# Patient Record
Sex: MALE | Race: ASIAN | NOT HISPANIC OR LATINO | URBAN - METROPOLITAN AREA
[De-identification: names, ages, dates, MRNs, and addresses within clinical notes are randomized per-mention and may not be internally consistent; named-entity substitution may affect disease eponyms.]

---

## 2020-01-04 ENCOUNTER — INPATIENT (INPATIENT)
Facility: HOSPITAL | Age: 50
LOS: 2 days | Discharge: HOME | DRG: 65 | End: 2020-01-07
Attending: INTERNAL MEDICINE | Admitting: INTERNAL MEDICINE
Payer: MEDICAID

## 2020-01-04 VITALS — WEIGHT: 194.01 LBS | HEIGHT: 67 IN

## 2020-01-04 DIAGNOSIS — I63.9 CEREBRAL INFARCTION, UNSPECIFIED: ICD-10-CM

## 2020-01-04 DIAGNOSIS — I16.1 HYPERTENSIVE EMERGENCY: ICD-10-CM

## 2020-01-04 DIAGNOSIS — Z29.9 ENCOUNTER FOR PROPHYLACTIC MEASURES, UNSPECIFIED: ICD-10-CM

## 2020-01-04 DIAGNOSIS — E11.9 TYPE 2 DIABETES MELLITUS WITHOUT COMPLICATIONS: ICD-10-CM

## 2020-01-04 LAB
ALBUMIN SERPL ELPH-MCNC: 3.9 G/DL — SIGNIFICANT CHANGE UP (ref 3.5–5)
ALP SERPL-CCNC: 75 U/L — SIGNIFICANT CHANGE UP (ref 40–120)
ALT FLD-CCNC: 28 U/L DA — SIGNIFICANT CHANGE UP (ref 10–60)
ANION GAP SERPL CALC-SCNC: 8 MMOL/L — SIGNIFICANT CHANGE UP (ref 5–17)
APTT BLD: 31 SEC — SIGNIFICANT CHANGE UP (ref 27.5–36.3)
AST SERPL-CCNC: 11 U/L — SIGNIFICANT CHANGE UP (ref 10–40)
BASOPHILS # BLD AUTO: 0.05 K/UL — SIGNIFICANT CHANGE UP (ref 0–0.2)
BASOPHILS NFR BLD AUTO: 0.5 % — SIGNIFICANT CHANGE UP (ref 0–2)
BILIRUB SERPL-MCNC: 0.6 MG/DL — SIGNIFICANT CHANGE UP (ref 0.2–1.2)
BUN SERPL-MCNC: 10 MG/DL — SIGNIFICANT CHANGE UP (ref 7–18)
CALCIUM SERPL-MCNC: 9 MG/DL — SIGNIFICANT CHANGE UP (ref 8.4–10.5)
CHLORIDE SERPL-SCNC: 105 MMOL/L — SIGNIFICANT CHANGE UP (ref 96–108)
CHOLEST SERPL-MCNC: 151 MG/DL — SIGNIFICANT CHANGE UP (ref 10–199)
CK MB BLD-MCNC: <1.8 % — SIGNIFICANT CHANGE UP (ref 0–3.5)
CK MB CFR SERPL CALC: <1 NG/ML — SIGNIFICANT CHANGE UP (ref 0–3.6)
CK SERPL-CCNC: 57 U/L — SIGNIFICANT CHANGE UP (ref 35–232)
CO2 SERPL-SCNC: 25 MMOL/L — SIGNIFICANT CHANGE UP (ref 22–31)
CREAT SERPL-MCNC: 1.06 MG/DL — SIGNIFICANT CHANGE UP (ref 0.5–1.3)
CRP SERPL-MCNC: 0.65 MG/DL — HIGH (ref 0–0.4)
EOSINOPHIL # BLD AUTO: 0.08 K/UL — SIGNIFICANT CHANGE UP (ref 0–0.5)
EOSINOPHIL NFR BLD AUTO: 0.8 % — SIGNIFICANT CHANGE UP (ref 0–6)
GLUCOSE SERPL-MCNC: 127 MG/DL — HIGH (ref 70–99)
HBA1C BLD-MCNC: 7.3 % — HIGH (ref 4–5.6)
HCT VFR BLD CALC: 47.1 % — SIGNIFICANT CHANGE UP (ref 39–50)
HDLC SERPL-MCNC: 51 MG/DL — SIGNIFICANT CHANGE UP
HGB BLD-MCNC: 16.1 G/DL — SIGNIFICANT CHANGE UP (ref 13–17)
IMM GRANULOCYTES NFR BLD AUTO: 0.5 % — SIGNIFICANT CHANGE UP (ref 0–1.5)
INR BLD: 1.1 RATIO — SIGNIFICANT CHANGE UP (ref 0.88–1.16)
LIPID PNL WITH DIRECT LDL SERPL: 86 MG/DL — SIGNIFICANT CHANGE UP
LYMPHOCYTES # BLD AUTO: 2.21 K/UL — SIGNIFICANT CHANGE UP (ref 1–3.3)
LYMPHOCYTES # BLD AUTO: 21.8 % — SIGNIFICANT CHANGE UP (ref 13–44)
MCHC RBC-ENTMCNC: 28.2 PG — SIGNIFICANT CHANGE UP (ref 27–34)
MCHC RBC-ENTMCNC: 34.2 GM/DL — SIGNIFICANT CHANGE UP (ref 32–36)
MCV RBC AUTO: 82.5 FL — SIGNIFICANT CHANGE UP (ref 80–100)
MONOCYTES # BLD AUTO: 0.48 K/UL — SIGNIFICANT CHANGE UP (ref 0–0.9)
MONOCYTES NFR BLD AUTO: 4.7 % — SIGNIFICANT CHANGE UP (ref 2–14)
NEUTROPHILS # BLD AUTO: 7.29 K/UL — SIGNIFICANT CHANGE UP (ref 1.8–7.4)
NEUTROPHILS NFR BLD AUTO: 71.7 % — SIGNIFICANT CHANGE UP (ref 43–77)
NRBC # BLD: 0 /100 WBCS — SIGNIFICANT CHANGE UP (ref 0–0)
PLATELET # BLD AUTO: 219 K/UL — SIGNIFICANT CHANGE UP (ref 150–400)
POTASSIUM SERPL-MCNC: 3.3 MMOL/L — LOW (ref 3.5–5.3)
POTASSIUM SERPL-SCNC: 3.3 MMOL/L — LOW (ref 3.5–5.3)
PROT SERPL-MCNC: 7.6 G/DL — SIGNIFICANT CHANGE UP (ref 6–8.3)
PROTHROM AB SERPL-ACNC: 12.2 SEC — SIGNIFICANT CHANGE UP (ref 10–12.9)
RBC # BLD: 5.71 M/UL — SIGNIFICANT CHANGE UP (ref 4.2–5.8)
RBC # FLD: 13.4 % — SIGNIFICANT CHANGE UP (ref 10.3–14.5)
SODIUM SERPL-SCNC: 138 MMOL/L — SIGNIFICANT CHANGE UP (ref 135–145)
TOTAL CHOLESTEROL/HDL RATIO MEASUREMENT: 3 RATIO — LOW (ref 3.4–9.6)
TRIGL SERPL-MCNC: 69 MG/DL — SIGNIFICANT CHANGE UP (ref 10–149)
TROPONIN I SERPL-MCNC: 0.08 NG/ML — HIGH (ref 0–0.04)
TROPONIN I SERPL-MCNC: 0.19 NG/ML — HIGH (ref 0–0.04)
TROPONIN I SERPL-MCNC: 0.66 NG/ML — HIGH (ref 0–0.04)
WBC # BLD: 10.16 K/UL — SIGNIFICANT CHANGE UP (ref 3.8–10.5)
WBC # FLD AUTO: 10.16 K/UL — SIGNIFICANT CHANGE UP (ref 3.8–10.5)

## 2020-01-04 PROCEDURE — 70498 CT ANGIOGRAPHY NECK: CPT | Mod: 26

## 2020-01-04 PROCEDURE — 70450 CT HEAD/BRAIN W/O DYE: CPT | Mod: 26

## 2020-01-04 PROCEDURE — 71045 X-RAY EXAM CHEST 1 VIEW: CPT | Mod: 26

## 2020-01-04 PROCEDURE — 70496 CT ANGIOGRAPHY HEAD: CPT | Mod: 26

## 2020-01-04 PROCEDURE — 99291 CRITICAL CARE FIRST HOUR: CPT

## 2020-01-04 RX ORDER — LABETALOL HCL 100 MG
10 TABLET ORAL ONCE
Refills: 0 | Status: DISCONTINUED | OUTPATIENT
Start: 2020-01-04 | End: 2020-01-04

## 2020-01-04 RX ORDER — LABETALOL HCL 100 MG
10 TABLET ORAL ONCE
Refills: 0 | Status: COMPLETED | OUTPATIENT
Start: 2020-01-04 | End: 2020-01-04

## 2020-01-04 RX ORDER — LABETALOL HCL 100 MG
100 TABLET ORAL EVERY 8 HOURS
Refills: 0 | Status: DISCONTINUED | OUTPATIENT
Start: 2020-01-04 | End: 2020-01-06

## 2020-01-04 RX ORDER — AMLODIPINE BESYLATE 2.5 MG/1
5 TABLET ORAL DAILY
Refills: 0 | Status: DISCONTINUED | OUTPATIENT
Start: 2020-01-04 | End: 2020-01-06

## 2020-01-04 RX ORDER — HEPARIN SODIUM 5000 [USP'U]/ML
5000 INJECTION INTRAVENOUS; SUBCUTANEOUS EVERY 8 HOURS
Refills: 0 | Status: DISCONTINUED | OUTPATIENT
Start: 2020-01-04 | End: 2020-01-07

## 2020-01-04 RX ORDER — HYDRALAZINE HCL 50 MG
10 TABLET ORAL ONCE
Refills: 0 | Status: COMPLETED | OUTPATIENT
Start: 2020-01-04 | End: 2020-01-04

## 2020-01-04 RX ORDER — INSULIN LISPRO 100/ML
VIAL (ML) SUBCUTANEOUS
Refills: 0 | Status: DISCONTINUED | OUTPATIENT
Start: 2020-01-04 | End: 2020-01-07

## 2020-01-04 RX ORDER — POTASSIUM CHLORIDE 20 MEQ
40 PACKET (EA) ORAL EVERY 4 HOURS
Refills: 0 | Status: COMPLETED | OUTPATIENT
Start: 2020-01-04 | End: 2020-01-05

## 2020-01-04 RX ORDER — ASPIRIN/CALCIUM CARB/MAGNESIUM 324 MG
81 TABLET ORAL DAILY
Refills: 0 | Status: DISCONTINUED | OUTPATIENT
Start: 2020-01-04 | End: 2020-01-07

## 2020-01-04 RX ADMIN — Medication 10 MILLIGRAM(S): at 21:45

## 2020-01-04 RX ADMIN — Medication 100 MILLIGRAM(S): at 23:19

## 2020-01-04 RX ADMIN — Medication 40 MILLIEQUIVALENT(S): at 21:46

## 2020-01-04 RX ADMIN — HEPARIN SODIUM 5000 UNIT(S): 5000 INJECTION INTRAVENOUS; SUBCUTANEOUS at 21:46

## 2020-01-04 RX ADMIN — Medication 10 MILLIGRAM(S): at 16:48

## 2020-01-04 RX ADMIN — AMLODIPINE BESYLATE 5 MILLIGRAM(S): 2.5 TABLET ORAL at 23:19

## 2020-01-04 RX ADMIN — Medication 81 MILLIGRAM(S): at 16:47

## 2020-01-04 RX ADMIN — Medication 10 MILLIGRAM(S): at 14:17

## 2020-01-04 NOTE — H&P ADULT - PROBLEM SELECTOR PLAN 1
CT head chronic stroke left corpus callosum, concern for acute small h'age vs calcification    CT A head and neck neg   ekg sinus tachy  ?from [facility]tn emergency 198/118 s/p labetalol 10 iv push   not a candidate for tpa  dysphagia screen  will start on lipitor and asa for secondary prevention  f/u echo with bubble study   tele monitor for rhyhtm monitor   neurology and cardiology consult CT head chronic stroke left corpus callosum, concern for acute small h'age vs calcification    CT A head and neck neg   ekg sinus tachy  ?from [facility] emergency 198/118 s/p labetalol 10 iv push   not a candidate for tpa  dysphagia screen passed   started on lipitor and asa will hold plavix for now   will start on lipitor and asa for secondary prevention  f/u echo with bubble study   tele monitor for rhyhtm monitor   neurology and cardiology consult  Discussed with Dr. Fleming

## 2020-01-04 NOTE — H&P ADULT - PROBLEM SELECTOR PLAN 2
htn 198/118 with elevated trop trend down trop   s/p labetalol 10 iv push   permissive htn   gaol keep sbp >160 over next 24 hours htn 198/118 with elevated trop trend down trop   s/p labetalol 10 iv push   permissive htn   goal keep sbp >160 over next 24 hours  started labetalol 100mg tid, amlodipine 5mg od   r/o secondary causes of htn given hypokalemia   f/u aldosterone, renin activity, metanephrine, cortisol

## 2020-01-04 NOTE — ED PROVIDER NOTE - CARE PLAN
Principal Discharge DX:	Stroke  Secondary Diagnosis:	Hypertensive emergency without congestive heart failure

## 2020-01-04 NOTE — ED ADULT NURSE NOTE - NSIMPLEMENTINTERV_GEN_ALL_ED
Implemented All Fall with Harm Risk Interventions:  [place] to call system. Call bell, personal items and telephone within reach. Instruct patient to call for assistance. Room bathroom lighting operational. Non-slip footwear when patient is off stretcher. Physically safe environment: no spills, clutter or unnecessary equipment. Stretcher in lowest position, wheels locked, appropriate side rails in place. Provide visual cue, wrist band, yellow gown, etc. Monitor gait and stability. Monitor for mental status changes and reorient to person, place, and time. Review medications for side effects contributing to fall risk. Reinforce activity limits and safety measures with patient and family. Provide visual clues: red socks.

## 2020-01-04 NOTE — H&P ADULT - NSHPPHYSICALEXAM_GEN_ALL_CORE
Vital Signs Last 24 Hrs  T(C): 37.9 (04 Jan 2020 12:53), Max: 37.9 (04 Jan 2020 12:53)  T(F): 100.2 (04 Jan 2020 12:53), Max: 100.2 (04 Jan 2020 12:53)  HR: 94 (04 Jan 2020 14:14) (94 - 119)  BP: 198/118 (04 Jan 2020 14:14) (198/118 - 198/126)  BP(mean): --  RR: 23 (04 Jan 2020 14:14) (18 - 23)  SpO2: 98% (04 Jan 2020 14:14) (98% - 98%)    PHYSICAL EXAM:  GENERAL: NAD, well-developed, WN  HEAD:  Atraumatic, Normocephalic  EYES: EOMI, PERRLA, conjunctiva and sclera clear  NECK: Supple, No JVD  CHEST/LUNG: Clear to auscultation bilaterally; No wheeze  HEART: Regular rate and rhythm; s1+ s2+  ABDOMEN: Soft, Nontender, Nondistended; Bowel sounds present  EXTREMITIES:, No clubbing, cyanosis, or edema  NEUROLOGY:AAOx3, right sided facial droop, 4/5 right upper extremity strength, 5/5 right lower, left upper and lower extremities strength, normal sensation    SKIN: No rashes or lesions

## 2020-01-04 NOTE — H&P ADULT - PROBLEM SELECTOR PLAN 4
VTE score 0   will hold dvt ppx as per neurology 2/2 concern for h'age VTE score 0   heparin sq for dvt ppx

## 2020-01-04 NOTE — ED PROVIDER NOTE - CHPI ED SYMPTOMS NEG
no blurred vision/no dizziness/no fever/no loss of consciousness/no nausea/no numbness/no vomiting/no change in level of consciousness/no confusion

## 2020-01-04 NOTE — H&P ADULT - HISTORY OF PRESENT ILLNESS
Patient is 49 year old male hx of diet controlled DM came to ED after pt ED concern for stroke.   Per pt he was completely fine last night when he went to sleep, but today morning when he woke up he noticed slurred speech, right sided facial droop, right side upper and lower extremity weakness associated with dizziness, numbness on all over his face and double vision on turning left. Pt states his symptoms are currently improving his strength is improving but he still has slurred speech and his dizziness is gone. Pt denies fever, fall, head trauma, loc, fever, difficulty hearing, chills, cough, sob, palpitation, n/v/d/c, urinary problem except slow urinary stream 2/2 bph.     pt was code stroke in the ED, had ekg showed sinus tachycardia,   CT Brain Stroke Protocol:  Low-attenuation the left corpus callosum extending into left frontal white matter suggestive of chronic infarct or gliosis.  Punctate high attenuation focus in the left corpus callosum may representing calcification or small acute hemorrhage.

## 2020-01-04 NOTE — ED PROVIDER NOTE - OBJECTIVE STATEMENT
pt went to bed "fine last night" woke up this morning with left facial droop and difficulty speaking   called sister who called ems   has a family hx of DM and HTN  pt has not been to a doctor in many years  and states he was told diabetes in the past but just cut out sugar not on medications   does not know if he has htn

## 2020-01-04 NOTE — ED PROVIDER NOTE - CLINICAL SUMMARY MEDICAL DECISION MAKING FREE TEXT BOX
pt arrives with acute stroke symptoms   will get neuro consult, labs, ekg, give labetaol for elevated BP  will treat as hypertensive emergency and admit to tele for further work up pt is outside the window for TPA

## 2020-01-05 LAB
ALBUMIN SERPL ELPH-MCNC: 3.7 G/DL — SIGNIFICANT CHANGE UP (ref 3.5–5)
ALP SERPL-CCNC: 79 U/L — SIGNIFICANT CHANGE UP (ref 40–120)
ALT FLD-CCNC: 37 U/L DA — SIGNIFICANT CHANGE UP (ref 10–60)
ANION GAP SERPL CALC-SCNC: 9 MMOL/L — SIGNIFICANT CHANGE UP (ref 5–17)
AST SERPL-CCNC: 20 U/L — SIGNIFICANT CHANGE UP (ref 10–40)
BILIRUB SERPL-MCNC: 1 MG/DL — SIGNIFICANT CHANGE UP (ref 0.2–1.2)
BUN SERPL-MCNC: 8 MG/DL — SIGNIFICANT CHANGE UP (ref 7–18)
CALCIUM SERPL-MCNC: 8.9 MG/DL — SIGNIFICANT CHANGE UP (ref 8.4–10.5)
CHLORIDE SERPL-SCNC: 106 MMOL/L — SIGNIFICANT CHANGE UP (ref 96–108)
CHOLEST SERPL-MCNC: 160 MG/DL — SIGNIFICANT CHANGE UP (ref 10–199)
CO2 SERPL-SCNC: 25 MMOL/L — SIGNIFICANT CHANGE UP (ref 22–31)
CORTIS AM PEAK SERPL-MCNC: 10.2 UG/DL — SIGNIFICANT CHANGE UP (ref 6–18.4)
CREAT SERPL-MCNC: 1.01 MG/DL — SIGNIFICANT CHANGE UP (ref 0.5–1.3)
ERYTHROCYTE [SEDIMENTATION RATE] IN BLOOD: 6 MM/HR — SIGNIFICANT CHANGE UP (ref 0–15)
FOLATE SERPL-MCNC: 14.4 NG/ML — SIGNIFICANT CHANGE UP
GLUCOSE BLDC GLUCOMTR-MCNC: 134 MG/DL — HIGH (ref 70–99)
GLUCOSE BLDC GLUCOMTR-MCNC: 136 MG/DL — HIGH (ref 70–99)
GLUCOSE BLDC GLUCOMTR-MCNC: 151 MG/DL — HIGH (ref 70–99)
GLUCOSE BLDC GLUCOMTR-MCNC: 213 MG/DL — HIGH (ref 70–99)
GLUCOSE SERPL-MCNC: 162 MG/DL — HIGH (ref 70–99)
HBA1C BLD-MCNC: 7.3 % — HIGH (ref 4–5.6)
HCT VFR BLD CALC: 49.5 % — SIGNIFICANT CHANGE UP (ref 39–50)
HDLC SERPL-MCNC: 45 MG/DL — SIGNIFICANT CHANGE UP
HGB BLD-MCNC: 16.7 G/DL — SIGNIFICANT CHANGE UP (ref 13–17)
LIPID PNL WITH DIRECT LDL SERPL: 90 MG/DL — SIGNIFICANT CHANGE UP
MAGNESIUM SERPL-MCNC: 2.4 MG/DL — SIGNIFICANT CHANGE UP (ref 1.6–2.6)
MCHC RBC-ENTMCNC: 28.4 PG — SIGNIFICANT CHANGE UP (ref 27–34)
MCHC RBC-ENTMCNC: 33.7 GM/DL — SIGNIFICANT CHANGE UP (ref 32–36)
MCV RBC AUTO: 84.2 FL — SIGNIFICANT CHANGE UP (ref 80–100)
NRBC # BLD: 0 /100 WBCS — SIGNIFICANT CHANGE UP (ref 0–0)
PHOSPHATE SERPL-MCNC: 3 MG/DL — SIGNIFICANT CHANGE UP (ref 2.5–4.5)
PLATELET # BLD AUTO: 220 K/UL — SIGNIFICANT CHANGE UP (ref 150–400)
POTASSIUM SERPL-MCNC: 4 MMOL/L — SIGNIFICANT CHANGE UP (ref 3.5–5.3)
POTASSIUM SERPL-SCNC: 4 MMOL/L — SIGNIFICANT CHANGE UP (ref 3.5–5.3)
PROT SERPL-MCNC: 7.7 G/DL — SIGNIFICANT CHANGE UP (ref 6–8.3)
RBC # BLD: 5.88 M/UL — HIGH (ref 4.2–5.8)
RBC # FLD: 13.8 % — SIGNIFICANT CHANGE UP (ref 10.3–14.5)
SODIUM SERPL-SCNC: 140 MMOL/L — SIGNIFICANT CHANGE UP (ref 135–145)
TOTAL CHOLESTEROL/HDL RATIO MEASUREMENT: 3.6 RATIO — SIGNIFICANT CHANGE UP (ref 3.4–9.6)
TRIGL SERPL-MCNC: 126 MG/DL — SIGNIFICANT CHANGE UP (ref 10–149)
TROPONIN I SERPL-MCNC: 0.71 NG/ML — HIGH (ref 0–0.04)
TSH SERPL-MCNC: 1.15 UU/ML — SIGNIFICANT CHANGE UP (ref 0.34–4.82)
VIT B12 SERPL-MCNC: 414 PG/ML — SIGNIFICANT CHANGE UP (ref 232–1245)
WBC # BLD: 8.33 K/UL — SIGNIFICANT CHANGE UP (ref 3.8–10.5)
WBC # FLD AUTO: 8.33 K/UL — SIGNIFICANT CHANGE UP (ref 3.8–10.5)

## 2020-01-05 RX ORDER — SIMVASTATIN 20 MG/1
40 TABLET, FILM COATED ORAL AT BEDTIME
Refills: 0 | Status: DISCONTINUED | OUTPATIENT
Start: 2020-01-05 | End: 2020-01-07

## 2020-01-05 RX ADMIN — Medication 81 MILLIGRAM(S): at 11:46

## 2020-01-05 RX ADMIN — Medication 2: at 12:32

## 2020-01-05 RX ADMIN — HEPARIN SODIUM 5000 UNIT(S): 5000 INJECTION INTRAVENOUS; SUBCUTANEOUS at 22:02

## 2020-01-05 RX ADMIN — HEPARIN SODIUM 5000 UNIT(S): 5000 INJECTION INTRAVENOUS; SUBCUTANEOUS at 06:24

## 2020-01-05 RX ADMIN — Medication 100 MILLIGRAM(S): at 06:24

## 2020-01-05 RX ADMIN — Medication 100 MILLIGRAM(S): at 22:02

## 2020-01-05 RX ADMIN — Medication 100 MILLIGRAM(S): at 13:41

## 2020-01-05 RX ADMIN — SIMVASTATIN 40 MILLIGRAM(S): 20 TABLET, FILM COATED ORAL at 22:02

## 2020-01-05 RX ADMIN — HEPARIN SODIUM 5000 UNIT(S): 5000 INJECTION INTRAVENOUS; SUBCUTANEOUS at 13:41

## 2020-01-05 RX ADMIN — Medication 1: at 22:23

## 2020-01-05 RX ADMIN — Medication 40 MILLIEQUIVALENT(S): at 02:55

## 2020-01-05 NOTE — CHART NOTE - NSCHARTNOTEFT_GEN_A_CORE
EVENT:  Troponin I, Serum: 0.664:  ng/mL (01.04.20 @ 23:25)  Troponin I, Serum: 0.190 ng/mL (01.04.20 @ 16:49)  Troponin I, Serum: 0.078: I ng/mL (01.04.20 @ 12:59)      OBJECTIVE:  Vital Signs Last 24 Hrs  T(C): 37 (04 Jan 2020 23:18), Max: 37.9 (04 Jan 2020 12:53)  T(F): 98.6 (04 Jan 2020 23:18), Max: 100.2 (04 Jan 2020 12:53)  HR: 78 (04 Jan 2020 23:18) (78 - 119)  BP: 184/104 (04 Jan 2020 23:18) (157/111 - 206/118)  BP(mean): --  RR: 18 (04 Jan 2020 23:18) (18 - 23)  SpO2: 99% (04 Jan 2020 23:18) (96% - 99%)    FOCUSED PHYSICAL EXAM:    LABS:                        16.1   10.16 )-----------( 219      ( 04 Jan 2020 12:59 )             47.1   CARDIAC MARKERS ( 04 Jan 2020 23:25 )  0.664 ng/mL / x     / x     / x     / x      CARDIAC MARKERS ( 04 Jan 2020 16:49 )  0.190 ng/mL / x     / x     / x     / x      CARDIAC MARKERS ( 04 Jan 2020 12:59 )  0.078 ng/mL / x     / 57 U/L / x     / <1.0 ng/mL    01-04    138  |  105  |  10  ----------------------------<  127<H>  3.3<L>   |  25  |  1.06    Ca    9.0      04 Jan 2020 12:59    TPro  7.6  /  Alb  3.9  /  TBili  0.6  /  DBili  x   /  AST  11  /  ALT  28  /  AlkPhos  75  01-04      EKG:   IMGAGING:    ASSESSMENT:  HPI:  Patient is 49 year old male hx of diet controlled DM came to ED after pt ED concern for stroke.   Per pt he was completely fine last night when he went to sleep, but today morning when he woke up he noticed slurred speech, right sided facial droop, right side upper and lower extremity weakness associated with dizziness, numbness on all over his face and double vision on turning left. Pt states his symptoms are currently improving his strength is improving but he still has slurred speech and his dizziness is gone. Pt denies fever, fall, head trauma, loc, fever, difficulty hearing, chills, cough, sob, palpitation, n/v/d/c, urinary problem except slow urinary stream 2/2 bph.     pt was code stroke in the ED, had ekg showed sinus tachycardia,   CT Brain Stroke Protocol:  Low-attenuation the left corpus callosum extending into left frontal white matter suggestive of chronic infarct or gliosis.  Punctate high attenuation focus in the left corpus callosum may representing calcification or small acute hemorrhage. (04 Jan 2020 15:34)      PLAN:   1. EKG now  FOLLOW UP / RESULT: EVENT:  Troponin I, Serum: 0.664:  ng/mL (01.04.20 @ 23:25)  Troponin I, Serum: 0.190 ng/mL (01.04.20 @ 16:49)  Troponin I, Serum: 0.078: I ng/mL (01.04.20 @ 12:59)    BRIEF HPI: 49 year old male hx of diet controlled DM came to ED after pt ED concern for stroke.   Per pt he was completely fine last night when he went to sleep, but today morning when he woke up he noticed slurred speech, right sided facial droop, right side upper and lower extremity weakness associated with dizziness, numbness on all over his face and double vision on turning left. Symptoms resolved, MRI pending. Troponin trending up, denies chest pain, no EKG changes. Awaiting bed on tele floor for further management.    OBJECTIVE:  Vital Signs Last 24 Hrs  T(C): 37 (04 Jan 2020 23:18), Max: 37.9 (04 Jan 2020 12:53)  T(F): 98.6 (04 Jan 2020 23:18), Max: 100.2 (04 Jan 2020 12:53)  HR: 78 (04 Jan 2020 23:18) (78 - 119)  BP: 184/104 (04 Jan 2020 23:18) (157/111 - 206/118)  BP(mean): --  RR: 18 (04 Jan 2020 23:18) (18 - 23)  SpO2: 99% (04 Jan 2020 23:18) (96% - 99%)    FOCUSED PHYSICAL EXAM:  GENERAL: NAD, well-developed  HEAD:  Atraumatic, Normocephalic  EYES: EOMI, PERRLA, conjunctiva and sclera clear  NECK: Supple, No JVD  CHEST/LUNG: Clear to auscultation bilaterally; No wheezes or rales  HEART: Regular rate and rhythm; No murmurs, rubs, or gallops  ABDOMEN: Soft, Nontender, Nondistended, Normal bowel sounds  EXTREMITIES:  2+ Peripheral Pulses, No clubbing, cyanosis, or edema  Neurological: AOx4  Skin: Warm and dry  Musculoskeletal: Atraumatic, no joint effusions  Psychiatric: Normal affect    LABS:                        16.1   10.16 )-----------( 219      ( 04 Jan 2020 12:59 )             47.1   CARDIAC MARKERS ( 04 Jan 2020 23:25 )  0.664 ng/mL / x     / x     / x     / x      CARDIAC MARKERS ( 04 Jan 2020 16:49 )  0.190 ng/mL / x     / x     / x     / x      CARDIAC MARKERS ( 04 Jan 2020 12:59 )  0.078 ng/mL / x     / 57 U/L / x     / <1.0 ng/mL    01-04    138  |  105  |  10  ----------------------------<  127<H>  3.3<L>   |  25  |  1.06    Ca    9.0      04 Jan 2020 12:59    TPro  7.6  /  Alb  3.9  /  TBili  0.6  /  DBili  x   /  AST  11  /  ALT  28  /  AlkPhos  75  01-04      EKG: NSR HR 72 bpm, no ST segment  or t wave changes  IMAGING:  EXAM:  XR CHEST AP OR PA 1V                        PROCEDURE DATE:  01/04/2020    INTERPRETATION:  CLINICAL INDICATION: 49 years  Male with stroke.  COMPARISON: None  AP view of the chest demonstrates the lungs to be clear. There is no pleural effusion. There is no pneumothorax.  The heart is mildly enlarged. There is no mediastinal or hilar mass.   The pulmonary vasculature is normal.   Mild thoracic degenerative changes are present.  IMPRESSION:  Mild cardiomegaly. No acute infiltrate. Punctate high attenuation focus in the left corpus callosum may representing calcification or small acute hemorrhage. (04 Jan 2020 15:34)    PROBLEM: Troponin trending up probably due to hypertensive emergency without congestive heart failure  PLAN:   1. EKG now  2. Troponin at 6 am  3. Pain assessment per policy.    FOLLOW UP / RESULT: Follow up AM troponin

## 2020-01-05 NOTE — CONSULT NOTE ADULT - SUBJECTIVE AND OBJECTIVE BOX
Patient is a 49y old  Male who presents with a chief complaint of stroke symptoms  (05 Jan 2020 12:15)      HPI: Patient presented to ER yesterday after awakening with right sided facial droop, slurred speech and right sided weakness. He had gone to bed normally. When he presented he was not given TPA due to unknown last known normal.   He denies any prior hx of stroke or stroke like symptoms. He has double vision when looking to the left and he reports continued right sided weakness and poor dexterity with right side.   He denies any headache, nausea or vomiting or numbness or other associated symptoms.      PAST MEDICAL & SURGICAL HISTORY:  Pre-diabetes  No significant past surgical history  Benign prostatic hypertrophy      FAMILY HISTORY:  FH: diabetes mellitus: sister and father        Social Hx:  Nonsmoker, no drug or alcohol use    MEDICATIONS  (STANDING):    amLODIPine   Tablet 5 milliGRAM(s) Oral daily  aspirin enteric coated 81 milliGRAM(s) Oral daily  heparin  Injectable 5000 Unit(s) SubCutaneous every 8 hours  insulin lispro (HumaLOG) corrective regimen sliding scale   SubCutaneous Before meals and at bedtime  labetalol 100 milliGRAM(s) Oral every 8 hours  simvastatin 40 milliGRAM(s) Oral at bedtime       Allergies:    No Known Allergies    Intolerances        ROS: Pertinent positives in HPI, all other ROS were reviewed and are negative.      Vital Signs Last 24 Hrs  T(C): 36.8 (05 Jan 2020 08:27), Max: 37 (04 Jan 2020 23:18)  T(F): 98.3 (05 Jan 2020 08:27), Max: 98.6 (04 Jan 2020 23:18)  HR: 87 (05 Jan 2020 08:27) (78 - 94)  BP: 138/84 (05 Jan 2020 08:27) (138/84 - 206/118)  BP(mean): --  RR: 18 (05 Jan 2020 08:27) (18 - 23)  SpO2: 97% (05 Jan 2020 08:27) (96% - 99%)        Constitutional: awake and alert.  HEENT: PERRLA, EOMI,   Neck: Supple.  Respiratory: Breath sounds are clear bilaterally  Cardiovascular: S1 and S2, regular / irregular rhythm  Gastrointestinal: soft, nontender  Extremities:  no edema  Vascular: Carotid  Bruit - no  Musculoskeletal: no joint swelling/tenderness, no abnormal movements  Skin: No rashes    Neurological exam:  HF: A x O x 3. Appropriately interactive, normal affect. Speech is slurred, No Aphasia or paraphasic errors. Naming /repetition intact   CN: BETTINA, on Left gaze he has double vision due to left CN 6 palsy  , VFF, facial sensation normal, no NLFD, tongue midline, Palate moves equally, SCM equal bilaterally  Motor: No pronator drift, Strength 5-/5 on right arm and leg and he has drift of left leg, normal bulk and tone, no tremor, rigidity or bradykinesia.    Sens: Intact to light touch / PP/ VS  Reflexes: Symmetric and normal . BJ 2+, BR 2+, KJ 2+, AJ 2+, downgoing toes b/l  Coord:  No FNFA,  heel to shin on right side was dysmetric, SINDY intact  Gait/Balance: Did not assess     NIHSS: was a 3           Labs:                        16.7   8.33  )-----------( 220      ( 05 Jan 2020 07:10 )             49.5     01-05    140  |  106  |  8   ----------------------------<  162<H>  4.0   |  25  |  1.01    Ca    8.9      05 Jan 2020 07:10  Phos  3.0     01-05  Mg     2.4     01-05    TPro  7.7  /  Alb  3.7  /  TBili  1.0  /  DBili  x   /  AST  20  /  ALT  37  /  AlkPhos  79  01-05    01-05 [id number] 7.3  01-04 [id number] 7.3    01-05 Chol 160 LDL 90 HDL 45 Trig 126, 01-04 Chol 151 LDL 86 HDL 51 Trig 69  PT/INR - ( 04 Jan 2020 12:59 )   PT: 12.2 sec;   INR: 1.10 ratio         PTT - ( 04 Jan 2020 12:59 )  PTT:31.0 sec    Radiology report:  - CT head: showed old chronic infarct in the left frontal white matter, corpus callosum and question of calcification vs hyperattenuation in left CC    CTA of the neck and head was unremarkable and no evidence of large vessel occlusion.      A/P: 49 year old presenting with new onset right sided weakness and slurred speech and concerning for an acute infarct, likely small vessel or embolic source of unknown cause. Would recommend the following:     - No IV tpa given because no last known normal     - ASA 81 mg for now and once MRI obtained and no blood would also add plavix 75 mg for 3 weeks and then ASA alone per CHANCE trial    - Statin daily    - Dysphagia screen    - DVT prophylaxis    - MRI brain pending    - PT eval    - Speech/swallow eval    -TTE and cardiology evaluation     Total Critical Care Time spent: 70 minutes and all questions answered and discussed with NP and medical team.

## 2020-01-05 NOTE — PATIENT PROFILE ADULT - NSPROEDALEARNPREF_GEN_A_NUR
verbal instruction/written material written material/verbal instruction/pictorial/skill demonstration

## 2020-01-05 NOTE — PROGRESS NOTE ADULT - ASSESSMENT
49 yr old male, from home denies past medical history not seen doctor over 20 yrs , admit hospital for facial droop and right weakness, CT head left corpus callosum extended left front white matter chronic infract with punctate calcification small hemorrhage(?), CTA neck/brain unremarkable, neuro consult TPA not given due to pass the window, continue ASA/plavis, MRI of brain burble study, cardiology consult, tele, PT eval fall precaution

## 2020-01-05 NOTE — CONSULT NOTE ADULT - SUBJECTIVE AND OBJECTIVE BOX
Requesting Physician : Dr. Walker    Reason for Consultation: Abnormal ECG; rule out CV cause of possible CVA    HISTORY OF PRESENT ILLNESS:  48 yo M with history of DM who is being seen for cardioembolic cause of possible CVA.  The patient was admitted with right facial numbness for one day that was associated with dizziness, double vision, and right sided weakness.  The patient denies chest pain or anginal symptoms.  He has no CORRAL, orthopnea, or LE edema.  The patient was admitted for stroke workup.  Code stroke was called but the patient did not receive TPA because he was out of the time window.  CTH was performed showing chronic infarct.  Cardiac workup thus far includes ECG showing SR with non-specific T wave changes and cardiac enzymes that show trace positive troponin.           PAST MEDICAL & SURGICAL HISTORY:  Pre-diabetes  No significant past surgical history          MEDICATIONS:  MEDICATIONS  (STANDING):  amLODIPine   Tablet 5 milliGRAM(s) Oral daily  aspirin enteric coated 81 milliGRAM(s) Oral daily  heparin  Injectable 5000 Unit(s) SubCutaneous every 8 hours  insulin lispro (HumaLOG) corrective regimen sliding scale   SubCutaneous Before meals and at bedtime  labetalol 100 milliGRAM(s) Oral every 8 hours      Allergies    No Known Allergies    Intolerances        FAMILY HISTORY:  FH: diabetes mellitus: sister and father    Non-contributary for premature coronary disease or sudden cardiac death    SOCIAL HISTORY:    [x ] Non-smoker  [ ] Smoker  [ ] Alcohol      REVIEW OF SYSTEMS:  [ ]chest pain  [  ]shortness of breath  [  ]palpitations  [  ]syncope  [ ]near syncope [x ]upper extremity weakness   [x ] lower extremity weakness  [ x ]diplopia  [  ]altered mental status   [  ]fevers  [ ]chills [ ]nausea  [ ]vomitting  [  ]dysphagia    [ ]abdominal pain  [ ]melena  [ ]BRBPR    [  ]epistaxis  [  ]rash    [ ]lower extremity edema        [x ] All others negative	  [ ] Unable to obtain    PHYSICAL EXAM:  T(C): 36.8 (01-05-20 @ 08:27), Max: 37.9 (01-04-20 @ 12:53)  HR: 87 (01-05-20 @ 08:27) (78 - 119)  BP: 138/84 (01-05-20 @ 08:27) (138/84 - 206/118)  RR: 18 (01-05-20 @ 08:27) (18 - 23)  SpO2: 97% (01-05-20 @ 08:27) (96% - 99%)  Wt(kg): --  I&O's Summary        	  Lymphatic: No lymphadenopathy , no edema  Cardiovascular: Normal S1 S2, No JVD, No murmurs , Peripheral pulses palpable 2+ bilaterally  Respiratory: Lungs clear to auscultation, normal effort 	  Gastrointestinal:  Soft, Non-tender, + BS	  Skin: No rashes, No ecchymoses, No cyanosis, warm to touch          TELEMETRY: SR	    ECG:  SR, non-specific t wave changes 	  RADIOLOGY:  OTHER:     DIAGNOSTIC TESTING:  [ ] Echocardiogram:  [ ]  Catheterization:  [ ] Stress Test:    	  	  LABS:	 	    CARDIAC MARKERS:  CARDIAC MARKERS ( 05 Jan 2020 07:10 )  0.713 ng/mL / x     / x     / x     / x      CARDIAC MARKERS ( 04 Jan 2020 23:25 )  0.664 ng/mL / x     / x     / x     / x      CARDIAC MARKERS ( 04 Jan 2020 16:49 )  0.190 ng/mL / x     / x     / x     / x      CARDIAC MARKERS ( 04 Jan 2020 12:59 )  0.078 ng/mL / x     / 57 U/L / x     / <1.0 ng/mL                              16.7   8.33  )-----------( 220      ( 05 Jan 2020 07:10 )             49.5     01-05    140  |  106  |  8   ----------------------------<  162<H>  4.0   |  25  |  1.01    Ca    8.9      05 Jan 2020 07:10  Phos  3.0     01-05  Mg     2.4     01-05    TPro  7.7  /  Alb  3.7  /  TBili  1.0  /  DBili  x   /  AST  20  /  ALT  37  /  AlkPhos  79  01-05    proBNP:   Lipid Profile:   HgA1c: Hemoglobin A1C, Whole Blood: 7.3 % (01-05 @ 10:42)  Hemoglobin A1C, Whole Blood: 7.3 % (01-04 @ 22:20)    TSH: Thyroid Stimulating Hormone, Serum: 1.15 uU/mL (01-05 @ 07:10)      ASSESSMENT/PLAN: 48 yo M with history of DM who is being seen for cardioembolic cause of possible CVA.    -pt. with trace troponin with negative CPK and no anginal symptoms - therefore do not suspect acs  -would check TTE  -monitor tele to rule out afib of possible cva  -stroke workup workup per neuro  -further cardiac workup pending above    Jaleel Hess MD

## 2020-01-06 LAB
ALBUMIN SERPL ELPH-MCNC: 3.6 G/DL — SIGNIFICANT CHANGE UP (ref 3.5–5)
ALDOST SERPL-MCNC: 23 NG/DL — SIGNIFICANT CHANGE UP
ALP SERPL-CCNC: 77 U/L — SIGNIFICANT CHANGE UP (ref 40–120)
ALT FLD-CCNC: 36 U/L DA — SIGNIFICANT CHANGE UP (ref 10–60)
ANION GAP SERPL CALC-SCNC: 5 MMOL/L — SIGNIFICANT CHANGE UP (ref 5–17)
AST SERPL-CCNC: 16 U/L — SIGNIFICANT CHANGE UP (ref 10–40)
BILIRUB SERPL-MCNC: 0.9 MG/DL — SIGNIFICANT CHANGE UP (ref 0.2–1.2)
BUN SERPL-MCNC: 12 MG/DL — SIGNIFICANT CHANGE UP (ref 7–18)
CALCIUM SERPL-MCNC: 9.2 MG/DL — SIGNIFICANT CHANGE UP (ref 8.4–10.5)
CHLORIDE SERPL-SCNC: 107 MMOL/L — SIGNIFICANT CHANGE UP (ref 96–108)
CO2 SERPL-SCNC: 28 MMOL/L — SIGNIFICANT CHANGE UP (ref 22–31)
CREAT SERPL-MCNC: 1.16 MG/DL — SIGNIFICANT CHANGE UP (ref 0.5–1.3)
GLUCOSE BLDC GLUCOMTR-MCNC: 113 MG/DL — HIGH (ref 70–99)
GLUCOSE BLDC GLUCOMTR-MCNC: 128 MG/DL — HIGH (ref 70–99)
GLUCOSE BLDC GLUCOMTR-MCNC: 140 MG/DL — HIGH (ref 70–99)
GLUCOSE BLDC GLUCOMTR-MCNC: 176 MG/DL — HIGH (ref 70–99)
GLUCOSE SERPL-MCNC: 163 MG/DL — HIGH (ref 70–99)
HCT VFR BLD CALC: 47 % — SIGNIFICANT CHANGE UP (ref 39–50)
HGB BLD-MCNC: 15.6 G/DL — SIGNIFICANT CHANGE UP (ref 13–17)
INR BLD: 1.15 RATIO — SIGNIFICANT CHANGE UP (ref 0.88–1.16)
MCHC RBC-ENTMCNC: 28.4 PG — SIGNIFICANT CHANGE UP (ref 27–34)
MCHC RBC-ENTMCNC: 33.2 GM/DL — SIGNIFICANT CHANGE UP (ref 32–36)
MCV RBC AUTO: 85.6 FL — SIGNIFICANT CHANGE UP (ref 80–100)
NRBC # BLD: 0 /100 WBCS — SIGNIFICANT CHANGE UP (ref 0–0)
PLATELET # BLD AUTO: 233 K/UL — SIGNIFICANT CHANGE UP (ref 150–400)
POTASSIUM SERPL-MCNC: 4 MMOL/L — SIGNIFICANT CHANGE UP (ref 3.5–5.3)
POTASSIUM SERPL-SCNC: 4 MMOL/L — SIGNIFICANT CHANGE UP (ref 3.5–5.3)
PROT SERPL-MCNC: 7.4 G/DL — SIGNIFICANT CHANGE UP (ref 6–8.3)
PROTHROM AB SERPL-ACNC: 12.8 SEC — SIGNIFICANT CHANGE UP (ref 10–12.9)
RBC # BLD: 5.49 M/UL — SIGNIFICANT CHANGE UP (ref 4.2–5.8)
RBC # FLD: 14 % — SIGNIFICANT CHANGE UP (ref 10.3–14.5)
RENIN DIRECT, PLASMA: 17.5 PG/ML — SIGNIFICANT CHANGE UP
SODIUM SERPL-SCNC: 140 MMOL/L — SIGNIFICANT CHANGE UP (ref 135–145)
WBC # BLD: 8.06 K/UL — SIGNIFICANT CHANGE UP (ref 3.8–10.5)
WBC # FLD AUTO: 8.06 K/UL — SIGNIFICANT CHANGE UP (ref 3.8–10.5)

## 2020-01-06 PROCEDURE — 70551 MRI BRAIN STEM W/O DYE: CPT | Mod: 26

## 2020-01-06 RX ORDER — LABETALOL HCL 100 MG
100 TABLET ORAL EVERY 8 HOURS
Refills: 0 | Status: DISCONTINUED | OUTPATIENT
Start: 2020-01-06 | End: 2020-01-07

## 2020-01-06 RX ORDER — AMLODIPINE BESYLATE 2.5 MG/1
5 TABLET ORAL DAILY
Refills: 0 | Status: DISCONTINUED | OUTPATIENT
Start: 2020-01-06 | End: 2020-01-07

## 2020-01-06 RX ADMIN — Medication 100 MILLIGRAM(S): at 13:05

## 2020-01-06 RX ADMIN — HEPARIN SODIUM 5000 UNIT(S): 5000 INJECTION INTRAVENOUS; SUBCUTANEOUS at 13:05

## 2020-01-06 RX ADMIN — Medication 81 MILLIGRAM(S): at 12:17

## 2020-01-06 RX ADMIN — Medication 100 MILLIGRAM(S): at 21:37

## 2020-01-06 RX ADMIN — HEPARIN SODIUM 5000 UNIT(S): 5000 INJECTION INTRAVENOUS; SUBCUTANEOUS at 21:37

## 2020-01-06 RX ADMIN — HEPARIN SODIUM 5000 UNIT(S): 5000 INJECTION INTRAVENOUS; SUBCUTANEOUS at 05:20

## 2020-01-06 RX ADMIN — Medication 1: at 12:17

## 2020-01-06 RX ADMIN — SIMVASTATIN 40 MILLIGRAM(S): 20 TABLET, FILM COATED ORAL at 21:37

## 2020-01-06 NOTE — PROGRESS NOTE ADULT - SUBJECTIVE AND OBJECTIVE BOX
Patient is a 49y old  Male who presents with a chief complaint of CVA (05 Jan 2020 13:50)/right debbie      INTERVAL HPI/OVERNIGHT EVENTS:  T(C): 36.4 (01-06-20 @ 07:22), Max: 36.9 (01-05-20 @ 23:29)  HR: 75 (01-06-20 @ 07:22) (72 - 89)  BP: 147/93 (01-06-20 @ 07:22) (129/82 - 156/96)  RR: 18 (01-06-20 @ 07:22) (17 - 18)  SpO2: 96% (01-06-20 @ 07:22) (94% - 98%)  Wt(kg): --    LABS:                        15.6   8.06  )-----------( 233      ( 06 Jan 2020 08:21 )             47.0     01-06    140  |  107  |  12  ----------------------------<  163<H>  4.0   |  28  |  1.16    Ca    9.2      06 Jan 2020 08:21  Phos  3.0     01-05  Mg     2.4     01-05    TPro  7.4  /  Alb  3.6  /  TBili  0.9  /  DBili  x   /  AST  16  /  ALT  36  /  AlkPhos  77  01-06    PT/INR - ( 06 Jan 2020 08:21 )   PT: 12.8 sec;   INR: 1.15 ratio         PTT - ( 04 Jan 2020 12:59 )  PTT:31.0 sec    CAPILLARY BLOOD GLUCOSE      POCT Blood Glucose.: 140 mg/dL (06 Jan 2020 08:09)  POCT Blood Glucose.: 151 mg/dL (05 Jan 2020 22:06)  POCT Blood Glucose.: 136 mg/dL (05 Jan 2020 17:01)  POCT Blood Glucose.: 213 mg/dL (05 Jan 2020 12:16)        RADIOLOGY & ADDITIONAL TESTS:    Consultant(s) Notes Reviewed:  [x ] YES  [ ] NO    PHYSICAL EXAM:  GENERAL: well built, well nourished  HEAD:  Atraumatic, Normocephalic  EYES: EOMI, PERRLA, conjunctiva and sclera clear  ENT: No tonsillar erythema, exudates, or enlargement; Moist mucous membranes, Good dentition, No lesions  NECK: Supple, No JVD, Normal thyroid, no enlarged nodes  NERVOUS SYSTEM:  Alert & Oriented X3, mild facial droop , right debbie  CHEST/LUNG: B/L good air entry; No rales, rhonchi, or wheezing  HEART: S1S2 normal, no S3, Regular rate and rhythm; No murmurs, rubs, or gallops  ABDOMEN: Soft, Nontender, Nondistended; Bowel sounds present  EXTREMITIES:  2+ Peripheral Pulses, No clubbing, cyanosis, or edema  LYMPH: No lymphadenopathy noted  SKIN: No rashes or lesions    Care Discussed with Consultants/Other Providers [ x] YES  [ ] NO

## 2020-01-06 NOTE — PROGRESS NOTE ADULT - PROBLEM SELECTOR PLAN 1
CT head chronic stroke left corpus callosum, concern for acute small h'age vs calcification    CT A head and neck neg   ekg sinus tachy  ?from [facility] emergency 198/118 s/p labetalol 10 iv push   not a candidate for tpa  dysphagia screen passed   started on lipitor and asa will hold plavix for now   will start on lipitor and asa for secondary prevention  f/u echo with bubble study   tele monitor for rhyhtm monitor   neurology and cardiology consult  Discussed with Dr. Fleming  01/06/2020: f/u results of the ECHO [ ] and MR [ ]

## 2020-01-06 NOTE — PROGRESS NOTE ADULT - SUBJECTIVE AND OBJECTIVE BOX
PGY1 Note discussed with supervising resident and primary attending.    Patient is a 49y old  Male who presents with a chief complaint of CVA (06 Jan 2020 10:49)    INTERVAL HPI/OVERNIGHT EVENTS:  - No acute events overnight  - Patient states that his right leg is "dead" and that he is unable to talk  - Patient speaks clearly to writer with no signs of slurred speech or facial droop  - Patient appears to have mild developmental delay  - Hemodynamically stable and afebrile  - Cortisol levels are normal     MEDICATIONS  (STANDING):  amLODIPine   Tablet 5 milliGRAM(s) Oral daily  aspirin enteric coated 81 milliGRAM(s) Oral daily  heparin  Injectable 5000 Unit(s) SubCutaneous every 8 hours  insulin lispro (HumaLOG) corrective regimen sliding scale   SubCutaneous Before meals and at bedtime  labetalol 100 milliGRAM(s) Oral every 8 hours  simvastatin 40 milliGRAM(s) Oral at bedtime    MEDICATIONS  (PRN):    Allergies    No Known Allergies    Intolerances    REVIEW OF SYSTEMS:  CONSTITUTIONAL: No fever, weight loss, or fatigue  RESPIRATORY: No cough, wheezing, chills or hemoptysis; No shortness of breath  CARDIOVASCULAR: No chest pain, palpitations, dizziness, or leg swelling  GASTROINTESTINAL: No abdominal or epigastric pain. No nausea, vomiting, or hematemesis; No diarrhea or constipation. No melena or hematochezia.  NEUROLOGICAL: No headaches, memory loss, loss of strength, numbness, or tremors; Weakness in the right leg  SKIN: No itching, burning, rashes, or lesions     Vital Signs Last 24 Hrs  T(C): 36.4 (06 Jan 2020 11:15), Max: 36.9 (05 Jan 2020 23:29)  T(F): 97.6 (06 Jan 2020 11:15), Max: 98.4 (05 Jan 2020 23:29)  HR: 75 (06 Jan 2020 07:22) (72 - 89)  BP: 150/91 (06 Jan 2020 11:15) (129/82 - 156/96)  BP(mean): --  RR: 18 (06 Jan 2020 11:15) (18 - 18)  SpO2: 100% (06 Jan 2020 11:15) (94% - 100%)    PHYSICAL EXAM:  GENERAL: NAD, well-groomed, well-developed  HEAD:  Atraumatic, Normocephalic  EYES: EOMI, PERRLA, conjunctiva and sclera clear  NECK: Supple, No JVD, Normal thyroid  CHEST/LUNG: Clear to percussion bilaterally; No rales, rhonchi, wheezing, or rubs  HEART: Regular rate and rhythm; No murmurs, rubs, or gallops  ABDOMEN: Soft, Nontender, Nondistended; Bowel sounds present  NERVOUS SYSTEM:  Alert & Oriented X3; Right sided weakness and minimal hemiparesis  EXTREMITIES:  2+ Peripheral Pulses, No clubbing, cyanosis, or edema    LABS:                        15.6   8.06  )-----------( 233      ( 06 Jan 2020 08:21 )             47.0     01-06    140  |  107  |  12  ----------------------------<  163<H>  4.0   |  28  |  1.16    Ca    9.2      06 Jan 2020 08:21  Phos  3.0     01-05  Mg     2.4     01-05    TPro  7.4  /  Alb  3.6  /  TBili  0.9  /  DBili  x   /  AST  16  /  ALT  36  /  AlkPhos  77  01-06    PT/INR - ( 06 Jan 2020 08:21 )   PT: 12.8 sec;   INR: 1.15 ratio      CAPILLARY BLOOD GLUCOSE    POCT Blood Glucose.: 176 mg/dL (06 Jan 2020 11:47)  POCT Blood Glucose.: 140 mg/dL (06 Jan 2020 08:09)  POCT Blood Glucose.: 151 mg/dL (05 Jan 2020 22:06)  POCT Blood Glucose.: 136 mg/dL (05 Jan 2020 17:01)    RADIOLOGY & ADDITIONAL TESTS:    Imaging Personally Reviewed:  [ ] YES  [ ] NO    Consultant(s) Notes Reviewed:  [ ] YES  [ ] NO

## 2020-01-06 NOTE — PROGRESS NOTE ADULT - PROBLEM SELECTOR PLAN 3
f/u hba1c   will start on hss  01/06/2020: Patient found to have a HgbA1c of 7.3; will need diabetic teaching and Metformin 500 mg daily; f/u with Nutrition [ ]

## 2020-01-06 NOTE — PROGRESS NOTE ADULT - SUBJECTIVE AND OBJECTIVE BOX
Patient denies chest pain or shortness of breath.   Review of systems otherwise (-)  	  MEDICATIONS:  MEDICATIONS  (STANDING):  amLODIPine   Tablet 5 milliGRAM(s) Oral daily  aspirin enteric coated 81 milliGRAM(s) Oral daily  heparin  Injectable 5000 Unit(s) SubCutaneous every 8 hours  insulin lispro (HumaLOG) corrective regimen sliding scale   SubCutaneous Before meals and at bedtime  labetalol 100 milliGRAM(s) Oral every 8 hours  simvastatin 40 milliGRAM(s) Oral at bedtime      LABS:	 	    CARDIAC MARKERS:  CARDIAC MARKERS ( 05 Jan 2020 07:10 )  0.713 ng/mL / x     / x     / x     / x      CARDIAC MARKERS ( 04 Jan 2020 23:25 )  0.664 ng/mL / x     / x     / x     / x      CARDIAC MARKERS ( 04 Jan 2020 16:49 )  0.190 ng/mL / x     / x     / x     / x      CARDIAC MARKERS ( 04 Jan 2020 12:59 )  0.078 ng/mL / x     / 57 U/L / x     / <1.0 ng/mL                                15.6   8.06  )-----------( 233      ( 06 Jan 2020 08:21 )             47.0     Hemoglobin: 15.6 g/dL (01-06 @ 08:21)  Hemoglobin: 16.7 g/dL (01-05 @ 07:10)  Hemoglobin: 16.1 g/dL (01-04 @ 12:59)      01-06    140  |  107  |  12  ----------------------------<  163<H>  4.0   |  28  |  1.16    Ca    9.2      06 Jan 2020 08:21  Phos  3.0     01-05  Mg     2.4     01-05    TPro  7.4  /  Alb  3.6  /  TBili  0.9  /  DBili  x   /  AST  16  /  ALT  36  /  AlkPhos  77  01-06    Creatinine Trend: 1.16<--, 1.01<--, 1.06<--    COAGS:   PT/INR - ( 06 Jan 2020 08:21 )   PT: 12.8 sec;   INR: 1.15 ratio      PHYSICAL EXAM:  T(C): 36.4 (01-06-20 @ 07:22), Max: 36.9 (01-05-20 @ 23:29)  HR: 75 (01-06-20 @ 07:22) (72 - 89)  BP: 147/93 (01-06-20 @ 07:22) (129/82 - 156/96)  RR: 18 (01-06-20 @ 07:22) (17 - 18)  SpO2: 96% (01-06-20 @ 07:22) (94% - 98%)  Wt(kg): --  I&O's Summary        Gen: Appears well in NAD  HEENT:  (-)icterus (-)pallor  CV: N S1 S2 1/6 SPRING (+)2 Pulses B/l  Resp:  Clear to ausculatation B/L, normal effort  GI: (+) BS Soft, NT, ND  Lymph:  (-)Edema, (-)obvious lymphadenopathy  Skin: Warm to touch, Normal turgor  Psych: Appropriate mood and affect      TELEMETRY: 	  Sinus PVCs    ASSESSMENT/PLAN: 	49y  Male with history of DM who is being seen for cardioembolic cause of possible CVA and trace trop    -pt. with trace troponin with negative CPK and no anginal symptoms - therefore do not suspect acs  - awaiting  TTE  - No pertinent findings on tele thus far  -stroke workup  per neuro  - Cont ASA and statin    Carlos Rouse MD, FACC  BEEPER [phone]

## 2020-01-06 NOTE — PROGRESS NOTE ADULT - ASSESSMENT
49 yr old male, from home denies past medical history not seen doctor over 20 yrs , admit hospital for facial droop and right weakness, CT head left corpus callosum extended left front white matter chronic infract with punctate calcification small hemorrhage(?), CTA neck/brain unremarkable, neuro consult , TPA not given due to pass the window, continue ASA,  MRI of brain pending,  cardiology consult,TTE,  tele, rule A FIB,  PT eval , fall precaution, swallow evaluation. hold plavis by neuro until MRI of brain showed no bleeding.

## 2020-01-06 NOTE — PROGRESS NOTE ADULT - PROBLEM SELECTOR PLAN 2
htn 198/118 with elevated trop trend down trop   s/p labetalol 10 iv push   permissive htn   goal keep sbp >160 over next 24 hours  started labetalol 100mg tid, amlodipine 5mg od   r/o secondary causes of htn given hypokalemia   f/u aldosterone, renin activity, metanephrine, cortisol  01/06/2020: Aldosterone and cortisol levels are normal; f/u Renin and Metanephrine [ ]

## 2020-01-07 ENCOUNTER — TRANSCRIPTION ENCOUNTER (OUTPATIENT)
Age: 50
End: 2020-01-07

## 2020-01-07 VITALS
RESPIRATION RATE: 18 BRPM | TEMPERATURE: 97 F | DIASTOLIC BLOOD PRESSURE: 111 MMHG | HEART RATE: 86 BPM | SYSTOLIC BLOOD PRESSURE: 170 MMHG | OXYGEN SATURATION: 98 %

## 2020-01-07 DIAGNOSIS — I63.89 OTHER CEREBRAL INFARCTION: ICD-10-CM

## 2020-01-07 LAB
ALBUMIN SERPL ELPH-MCNC: 3.5 G/DL — SIGNIFICANT CHANGE UP (ref 3.5–5)
ALP SERPL-CCNC: 76 U/L — SIGNIFICANT CHANGE UP (ref 40–120)
ALT FLD-CCNC: 44 U/L DA — SIGNIFICANT CHANGE UP (ref 10–60)
ANION GAP SERPL CALC-SCNC: 6 MMOL/L — SIGNIFICANT CHANGE UP (ref 5–17)
AST SERPL-CCNC: 27 U/L — SIGNIFICANT CHANGE UP (ref 10–40)
BASOPHILS # BLD AUTO: 0.04 K/UL — SIGNIFICANT CHANGE UP (ref 0–0.2)
BASOPHILS NFR BLD AUTO: 0.6 % — SIGNIFICANT CHANGE UP (ref 0–2)
BILIRUB SERPL-MCNC: 0.9 MG/DL — SIGNIFICANT CHANGE UP (ref 0.2–1.2)
BUN SERPL-MCNC: 13 MG/DL — SIGNIFICANT CHANGE UP (ref 7–18)
CALCIUM SERPL-MCNC: 9.2 MG/DL — SIGNIFICANT CHANGE UP (ref 8.4–10.5)
CHLORIDE SERPL-SCNC: 109 MMOL/L — HIGH (ref 96–108)
CO2 SERPL-SCNC: 27 MMOL/L — SIGNIFICANT CHANGE UP (ref 22–31)
CREAT SERPL-MCNC: 1.15 MG/DL — SIGNIFICANT CHANGE UP (ref 0.5–1.3)
EOSINOPHIL # BLD AUTO: 0.06 K/UL — SIGNIFICANT CHANGE UP (ref 0–0.5)
EOSINOPHIL NFR BLD AUTO: 0.9 % — SIGNIFICANT CHANGE UP (ref 0–6)
GLUCOSE BLDC GLUCOMTR-MCNC: 130 MG/DL — HIGH (ref 70–99)
GLUCOSE BLDC GLUCOMTR-MCNC: 135 MG/DL — HIGH (ref 70–99)
GLUCOSE BLDC GLUCOMTR-MCNC: 152 MG/DL — HIGH (ref 70–99)
GLUCOSE SERPL-MCNC: 164 MG/DL — HIGH (ref 70–99)
HCT VFR BLD CALC: 47.4 % — SIGNIFICANT CHANGE UP (ref 39–50)
HGB BLD-MCNC: 15.5 G/DL — SIGNIFICANT CHANGE UP (ref 13–17)
IMM GRANULOCYTES NFR BLD AUTO: 0.5 % — SIGNIFICANT CHANGE UP (ref 0–1.5)
LYMPHOCYTES # BLD AUTO: 1.52 K/UL — SIGNIFICANT CHANGE UP (ref 1–3.3)
LYMPHOCYTES # BLD AUTO: 23.5 % — SIGNIFICANT CHANGE UP (ref 13–44)
MAGNESIUM SERPL-MCNC: 2.6 MG/DL — SIGNIFICANT CHANGE UP (ref 1.6–2.6)
MCHC RBC-ENTMCNC: 28.4 PG — SIGNIFICANT CHANGE UP (ref 27–34)
MCHC RBC-ENTMCNC: 32.7 GM/DL — SIGNIFICANT CHANGE UP (ref 32–36)
MCV RBC AUTO: 86.8 FL — SIGNIFICANT CHANGE UP (ref 80–100)
MONOCYTES # BLD AUTO: 0.46 K/UL — SIGNIFICANT CHANGE UP (ref 0–0.9)
MONOCYTES NFR BLD AUTO: 7.1 % — SIGNIFICANT CHANGE UP (ref 2–14)
NEUTROPHILS # BLD AUTO: 4.35 K/UL — SIGNIFICANT CHANGE UP (ref 1.8–7.4)
NEUTROPHILS NFR BLD AUTO: 67.4 % — SIGNIFICANT CHANGE UP (ref 43–77)
NRBC # BLD: 0 /100 WBCS — SIGNIFICANT CHANGE UP (ref 0–0)
PHOSPHATE SERPL-MCNC: 3.5 MG/DL — SIGNIFICANT CHANGE UP (ref 2.5–4.5)
PLATELET # BLD AUTO: 215 K/UL — SIGNIFICANT CHANGE UP (ref 150–400)
POTASSIUM SERPL-MCNC: 4.2 MMOL/L — SIGNIFICANT CHANGE UP (ref 3.5–5.3)
POTASSIUM SERPL-SCNC: 4.2 MMOL/L — SIGNIFICANT CHANGE UP (ref 3.5–5.3)
PROT SERPL-MCNC: 7.1 G/DL — SIGNIFICANT CHANGE UP (ref 6–8.3)
RBC # BLD: 5.46 M/UL — SIGNIFICANT CHANGE UP (ref 4.2–5.8)
RBC # FLD: 13.9 % — SIGNIFICANT CHANGE UP (ref 10.3–14.5)
SODIUM SERPL-SCNC: 142 MMOL/L — SIGNIFICANT CHANGE UP (ref 135–145)
WBC # BLD: 6.46 K/UL — SIGNIFICANT CHANGE UP (ref 3.8–10.5)
WBC # FLD AUTO: 6.46 K/UL — SIGNIFICANT CHANGE UP (ref 3.8–10.5)

## 2020-01-07 PROCEDURE — 85610 PROTHROMBIN TIME: CPT

## 2020-01-07 PROCEDURE — 84100 ASSAY OF PHOSPHORUS: CPT

## 2020-01-07 PROCEDURE — 99232 SBSQ HOSP IP/OBS MODERATE 35: CPT

## 2020-01-07 PROCEDURE — 71045 X-RAY EXAM CHEST 1 VIEW: CPT

## 2020-01-07 PROCEDURE — 84484 ASSAY OF TROPONIN QUANT: CPT

## 2020-01-07 PROCEDURE — 86850 RBC ANTIBODY SCREEN: CPT

## 2020-01-07 PROCEDURE — 86140 C-REACTIVE PROTEIN: CPT

## 2020-01-07 PROCEDURE — 80061 LIPID PANEL: CPT

## 2020-01-07 PROCEDURE — 70551 MRI BRAIN STEM W/O DYE: CPT

## 2020-01-07 PROCEDURE — 82962 GLUCOSE BLOOD TEST: CPT

## 2020-01-07 PROCEDURE — 82607 VITAMIN B-12: CPT

## 2020-01-07 PROCEDURE — 96374 THER/PROPH/DIAG INJ IV PUSH: CPT | Mod: XU

## 2020-01-07 PROCEDURE — 85027 COMPLETE CBC AUTOMATED: CPT

## 2020-01-07 PROCEDURE — 84244 ASSAY OF RENIN: CPT

## 2020-01-07 PROCEDURE — 83036 HEMOGLOBIN GLYCOSYLATED A1C: CPT

## 2020-01-07 PROCEDURE — 70498 CT ANGIOGRAPHY NECK: CPT

## 2020-01-07 PROCEDURE — 83735 ASSAY OF MAGNESIUM: CPT

## 2020-01-07 PROCEDURE — 85652 RBC SED RATE AUTOMATED: CPT

## 2020-01-07 PROCEDURE — 86901 BLOOD TYPING SEROLOGIC RH(D): CPT

## 2020-01-07 PROCEDURE — 85730 THROMBOPLASTIN TIME PARTIAL: CPT

## 2020-01-07 PROCEDURE — 83835 ASSAY OF METANEPHRINES: CPT

## 2020-01-07 PROCEDURE — 82533 TOTAL CORTISOL: CPT

## 2020-01-07 PROCEDURE — 82553 CREATINE MB FRACTION: CPT

## 2020-01-07 PROCEDURE — 93005 ELECTROCARDIOGRAM TRACING: CPT

## 2020-01-07 PROCEDURE — 84443 ASSAY THYROID STIM HORMONE: CPT

## 2020-01-07 PROCEDURE — 93306 TTE W/DOPPLER COMPLETE: CPT

## 2020-01-07 PROCEDURE — 86900 BLOOD TYPING SEROLOGIC ABO: CPT

## 2020-01-07 PROCEDURE — 70496 CT ANGIOGRAPHY HEAD: CPT

## 2020-01-07 PROCEDURE — 36415 COLL VENOUS BLD VENIPUNCTURE: CPT

## 2020-01-07 PROCEDURE — 99291 CRITICAL CARE FIRST HOUR: CPT | Mod: 25

## 2020-01-07 PROCEDURE — 70450 CT HEAD/BRAIN W/O DYE: CPT

## 2020-01-07 PROCEDURE — 80053 COMPREHEN METABOLIC PANEL: CPT

## 2020-01-07 PROCEDURE — 82746 ASSAY OF FOLIC ACID SERUM: CPT

## 2020-01-07 PROCEDURE — 82088 ASSAY OF ALDOSTERONE: CPT

## 2020-01-07 PROCEDURE — 82550 ASSAY OF CK (CPK): CPT

## 2020-01-07 RX ORDER — ATORVASTATIN CALCIUM 80 MG/1
80 TABLET, FILM COATED ORAL AT BEDTIME
Refills: 0 | Status: DISCONTINUED | OUTPATIENT
Start: 2020-01-07 | End: 2020-01-07

## 2020-01-07 RX ORDER — AMLODIPINE BESYLATE 2.5 MG/1
10 TABLET ORAL DAILY
Refills: 0 | Status: DISCONTINUED | OUTPATIENT
Start: 2020-01-07 | End: 2020-01-07

## 2020-01-07 RX ORDER — CLOPIDOGREL BISULFATE 75 MG/1
75 TABLET, FILM COATED ORAL DAILY
Refills: 0 | Status: DISCONTINUED | OUTPATIENT
Start: 2020-01-07 | End: 2020-01-07

## 2020-01-07 RX ORDER — AMLODIPINE BESYLATE 2.5 MG/1
1 TABLET ORAL
Qty: 14 | Refills: 0
Start: 2020-01-07 | End: 2020-01-20

## 2020-01-07 RX ORDER — ASPIRIN/CALCIUM CARB/MAGNESIUM 324 MG
1 TABLET ORAL
Qty: 14 | Refills: 0
Start: 2020-01-07 | End: 2020-01-20

## 2020-01-07 RX ORDER — LABETALOL HCL 100 MG
1 TABLET ORAL
Qty: 42 | Refills: 0
Start: 2020-01-07 | End: 2020-01-20

## 2020-01-07 RX ORDER — CLOPIDOGREL BISULFATE 75 MG/1
1 TABLET, FILM COATED ORAL
Qty: 14 | Refills: 0
Start: 2020-01-07 | End: 2020-01-20

## 2020-01-07 RX ORDER — ATORVASTATIN CALCIUM 80 MG/1
1 TABLET, FILM COATED ORAL
Qty: 14 | Refills: 0
Start: 2020-01-07 | End: 2020-01-20

## 2020-01-07 RX ADMIN — HEPARIN SODIUM 5000 UNIT(S): 5000 INJECTION INTRAVENOUS; SUBCUTANEOUS at 13:43

## 2020-01-07 RX ADMIN — Medication 100 MILLIGRAM(S): at 13:43

## 2020-01-07 RX ADMIN — CLOPIDOGREL BISULFATE 75 MILLIGRAM(S): 75 TABLET, FILM COATED ORAL at 11:56

## 2020-01-07 RX ADMIN — Medication 1: at 11:56

## 2020-01-07 RX ADMIN — AMLODIPINE BESYLATE 5 MILLIGRAM(S): 2.5 TABLET ORAL at 05:41

## 2020-01-07 RX ADMIN — HEPARIN SODIUM 5000 UNIT(S): 5000 INJECTION INTRAVENOUS; SUBCUTANEOUS at 05:41

## 2020-01-07 RX ADMIN — Medication 81 MILLIGRAM(S): at 11:56

## 2020-01-07 RX ADMIN — Medication 100 MILLIGRAM(S): at 05:41

## 2020-01-07 RX ADMIN — AMLODIPINE BESYLATE 10 MILLIGRAM(S): 2.5 TABLET ORAL at 17:19

## 2020-01-07 NOTE — DISCHARGE NOTE NURSING/CASE MANAGEMENT/SOCIAL WORK - PATIENT PORTAL LINK FT
You can access the FollowMyHealth Patient Portal offered by [facility] by registering at the following website: [email or web address]. By joining [facility]’s FollowMyHealth portal, you will also be able to view your health information using other applications (apps) compatible with our system.

## 2020-01-07 NOTE — DIETITIAN INITIAL EVALUATION ADULT. - PERTINENT LABORATORY DATA
01-07 Na142 mmol/L Glu 164 mg/dL<H> K+ 4.2 mmol/L Cr  1.15 mg/dL BUN 13 mg/dL   01-07 Phos 3.5 mg/dL   01-07 Alb 3.5 g/dL     01-05 [id number] 7.3 %<H>   01-05 Chol 160 mg/dL LDL 90 mg/dL HDL 45 mg/dL Trig 126 mg/dL  Finger stick ssmkz=778 to 213

## 2020-01-07 NOTE — DISCHARGE NOTE PROVIDER - NSDCCPCAREPLAN_GEN_ALL_CORE_FT
PRINCIPAL DISCHARGE DIAGNOSIS  Diagnosis: Stroke  Assessment and Plan of Treatment: You were found to have right sided facial droop and right upper/lower extremity weakness. CT head was performed and showed a chronic stroke in your left corpus callosum with a concern for an acute small hemorrhage or calcification in the brain. You were not a candidate for tPA. Follow-up CT Angiogram of the head and neck was found to be negative and your EKG was notable for normal rhythm but fast heart rate. You passed your dysphagia screen and were permitted to eat and were placed on Aspiring 81 mg, Plavix 75 mg, and Atorvastatin 80 mg in line with the stroke protocol. ECHO (heart imaging) was performed and showed an ejection fraction of 55% with grade 1 diastolic dysfunction. Final imaging including an MRI of your brain confirmed a small acute to subacute infarct in the left anterior cerebral artery along with chronic microhemorrhages consistent with hypertensive encephalopathy. Please continue taking Aspirin, Plavix (for 3 weeks), and Atorvastatin and follow up with Neurology outpatient in 1-2 for additional health care guidance.      SECONDARY DISCHARGE DIAGNOSES  Diagnosis: Hypertensive emergency without congestive heart failure  Assessment and Plan of Treatment: You presented with a BP of 198/118 with elevated troponins that trended down suggesting myocardial demand ischemia. You received a stat dose of IV Labetalol (anti-hypertensive) with good effect and were made to have permissive hypertension for 24 hours. You were then started on Amlodipine 10 mg daily and Labetalol 100 mg three times a day with good improvement in blood pressure. Secondary causes of hypertension including aldosterone, cortisol, and renin levels were found to be negative and you were discharged under good BP control. Please continue to take your medications as directed and follow up with your PCP.    Diagnosis: Diabetes  Assessment and Plan of Treatment: On routine labs you were found to have an elevated HgbA1c notable for a new diagnosis of Diabetes. While admitted you were placed on an insulin sliding scale with good glucose control. You are being discharged with information on diabetic teaching and are instructed to take Metformin 500 mg daily and follow-up with Dr. Rodríguez outpatient for additional healthcare maintenance.

## 2020-01-07 NOTE — PROGRESS NOTE ADULT - SUBJECTIVE AND OBJECTIVE BOX
Patient denies chest pain or shortness of breath.   Review of systems otherwise (-)  	  amLODIPine   Tablet 5 milliGRAM(s) Oral daily  aspirin enteric coated 81 milliGRAM(s) Oral daily  atorvastatin 80 milliGRAM(s) Oral at bedtime  clopidogrel Tablet 75 milliGRAM(s) Oral daily  heparin  Injectable 5000 Unit(s) SubCutaneous every 8 hours  insulin lispro (HumaLOG) corrective regimen sliding scale   SubCutaneous Before meals and at bedtime  labetalol 100 milliGRAM(s) Oral every 8 hours                            15.5   6.46  )-----------( 215      ( 07 Jan 2020 06:47 )             47.4       Hemoglobin: 15.5 g/dL (01-07 @ 06:47)  Hemoglobin: 15.6 g/dL (01-06 @ 08:21)  Hemoglobin: 16.7 g/dL (01-05 @ 07:10)  Hemoglobin: 16.1 g/dL (01-04 @ 12:59)      01-07    142  |  109<H>  |  13  ----------------------------<  164<H>  4.2   |  27  |  1.15    Ca    9.2      07 Jan 2020 06:47  Phos  3.5     01-07  Mg     2.6     01-07    TPro  7.1  /  Alb  3.5  /  TBili  0.9  /  DBili  x   /  AST  27  /  ALT  44  /  AlkPhos  76  01-07    Creatinine Trend: 1.15<--, 1.16<--, 1.01<--, 1.06<--    COAGS:     CARDIAC MARKERS ( 05 Jan 2020 07:10 )  0.713 ng/mL / x     / x     / x     / x      CARDIAC MARKERS ( 04 Jan 2020 23:25 )  0.664 ng/mL / x     / x     / x     / x      CARDIAC MARKERS ( 04 Jan 2020 16:49 )  0.190 ng/mL / x     / x     / x     / x      CARDIAC MARKERS ( 04 Jan 2020 12:59 )  0.078 ng/mL / x     / 57 U/L / x     / <1.0 ng/mL        T(C): 37 (01-07-20 @ 07:29), Max: 37 (01-07-20 @ 07:29)  HR: 76 (01-07-20 @ 07:29) (72 - 81)  BP: 136/92 (01-07-20 @ 07:29) (126/78 - 159/90)  RR: 18 (01-07-20 @ 07:29) (17 - 18)  SpO2: 97% (01-07-20 @ 07:29) (95% - 100%)  Wt(kg): --    I&O's Summary    07 Jan 2020 07:01  -  07 Jan 2020 10:38  --------------------------------------------------------  IN: 200 mL / OUT: 0 mL / NET: 200 mL      Gen: Appears well in NAD  HEENT:  (-)icterus (-)pallor  CV: N S1 S2 1/6 SPRING (+)2 Pulses B/l  Resp:  Clear to ausculatation B/L, normal effort  GI: (+) BS Soft, NT, ND  Lymph:  (-)Edema, (-)obvious lymphadenopathy  Skin: Warm to touch, Normal turgor  Psych: Appropriate mood and affect      TELEMETRY: 	  Sinus PVCs    ASSESSMENT/PLAN: 	49y  Male with history of DM who is being seen for cardioembolic cause of possible CVA and trace trop    -pt. with trace troponin with negative CPK and no anginal symptoms - therefore do not suspect acs.  Suspect this was due ot high afterload uncontrolled BP  - TTE with normal LV function and LVH  - No pertinent findings on tele thus far, can D/C tele  - Strokes do not appear embolic in nature.  No need for further inpatient cardiac w/u.  Can consider stress test as an outpatient when ok from neuro prospective.  - Cont ASA and statin  - D/C planning per med  - I will sign off please call back if needed    Carlos Rouse MD, [facility]  BEEPER [phone]

## 2020-01-07 NOTE — DISCHARGE NOTE PROVIDER - NSDCHHNEEDSERVICE_GEN_ALL_CORE
Observation and assessment/Medication teaching and assessment/Teaching and training/Rehabilitation services

## 2020-01-07 NOTE — DISCHARGE NOTE PROVIDER - CARE PROVIDER_API CALL
Spencer Rodríguez)  Internal Medicine  [address] [place] [address]  Phone: [phone]  Fax: [phone]  Follow Up Time: 2 weeks    Melissa Hendricks)  Neurology  [address], [address] [place] [address] [place] [address]  Phone: [phone]  Fax: [phone]  Follow Up Time: 2 weeks    Cindy Couch)  Internal Medicine  [address] [place] [address]  Phone: [phone]  Fax: [phone]  Follow Up Time: 2 weeks

## 2020-01-07 NOTE — PROGRESS NOTE ADULT - SUBJECTIVE AND OBJECTIVE BOX
Patient is a 49y old  Male who presents with a chief complaint of CVA (07 Jan 2020 10:38)/right debbie, embolic      INTERVAL HPI/OVERNIGHT EVENTS:  T(C): 37 (01-07-20 @ 07:29), Max: 37 (01-07-20 @ 07:29)  HR: 76 (01-07-20 @ 07:29) (72 - 81)  BP: 136/92 (01-07-20 @ 07:29) (126/78 - 159/90)  RR: 18 (01-07-20 @ 07:29) (17 - 18)  SpO2: 97% (01-07-20 @ 07:29) (95% - 100%)  Wt(kg): --    LABS:                        15.5   6.46  )-----------( 215      ( 07 Jan 2020 06:47 )             47.4     01-07    142  |  109<H>  |  13  ----------------------------<  164<H>  4.2   |  27  |  1.15    Ca    9.2      07 Jan 2020 06:47  Phos  3.5     01-07  Mg     2.6     01-07    TPro  7.1  /  Alb  3.5  /  TBili  0.9  /  DBili  x   /  AST  27  /  ALT  44  /  AlkPhos  76  01-07    PT/INR - ( 06 Jan 2020 08:21 )   PT: 12.8 sec;   INR: 1.15 ratio             CAPILLARY BLOOD GLUCOSE      POCT Blood Glucose.: 130 mg/dL (07 Jan 2020 07:56)  POCT Blood Glucose.: 113 mg/dL (06 Jan 2020 21:27)  POCT Blood Glucose.: 128 mg/dL (06 Jan 2020 17:09)  POCT Blood Glucose.: 176 mg/dL (06 Jan 2020 11:47)        RADIOLOGY & ADDITIONAL TESTS:  < from: MR Head No Cont (01.06.20 @ 16:59) >  IMPRESSION:    Small acute to subacute infarct in the left anterior cerebral artery distribution. Scattered chronic microhemorrhages in apattern most typical of hypertensive encephalopathy.      < end of copied text >  < from: Transthoracic Echocardiogram (01.06.20 @ 07:22) >  CONCLUSIONS:  1. Normal mitral valve. Trace mitral regurgitation.  2. Normal trileaflet aortic valve.  3. Aortic Root: 3.9 cm.    4. Mildly dilated left atrium.  LA volume index = 38 cc/m2.  5. Mild concentric left ventricular hypertrophy.  6. Normal Left Ventricular Systolic Function,  (EF = 55%)  7. Grade I diastolic dysfunction (Impaired relaxation).  8. Normal right atrium.  9. Normal right ventricular size and systolic function  (TAPSE 2.3 cm).  10. Unable to estimate RVSP.  11. There is trace tricuspid regurgitation.  12. There is trace pulmonic regurgitation.  13. No pericardial effusion.    < end of copied text >    Consultant(s) Notes Reviewed:  [x ] YES  [ ] NO    PHYSICAL EXAM:  GENERAL: well built, well nourished  HEAD:  Atraumatic, Normocephalic  EYES: EOMI, PERRLA, conjunctiva and sclera clear  ENT: No tonsillar erythema, exudates, or enlargement; Moist mucous membranes, Good dentition, No lesions  NECK: Supple, No JVD, Normal thyroid, no enlarged nodes  NERVOUS SYSTEM:  Alert & Oriented X3, mild facial droop right debbie  CHEST/LUNG: B/L good air entry; No rales, rhonchi, or wheezing  HEART: S1S2 normal, no S3, Regular rate and rhythm; No murmurs, rubs, or gallops  ABDOMEN: Soft, Nontender, Nondistended; Bowel sounds present  EXTREMITIES:  2+ Peripheral Pulses, No clubbing, cyanosis, or edema  LYMPH: No lymphadenopathy noted  SKIN: No rashes or lesions    Care Discussed with Consultants/Other Providers [ x] YES  [ ] NO

## 2020-01-07 NOTE — DISCHARGE NOTE PROVIDER - NSDCMRMEDTOKEN_GEN_ALL_CORE_FT
amLODIPine 10 mg oral tablet: 1 tab(s) orally once a day  aspirin 81 mg oral delayed release tablet: 1 tab(s) orally once a day  atorvastatin 80 mg oral tablet: 1 tab(s) orally once a day (at bedtime)  clopidogrel 75 mg oral tablet: 1 tab(s) orally once a day  labetalol 100 mg oral tablet: 1 tab(s) orally every 8 hours

## 2020-01-07 NOTE — PROGRESS NOTE ADULT - ASSESSMENT
49 yr old male, from home denies past medical history not seen doctor over 20 yrs , admit hospital for facial droop and right weakness, CT head left corpus callosum extended left front white matter chronic infract with punctate calcification small hemorrhage(?), CTA neck/brain unremarkable, neuro consult , TPA not given due to pass the window, continue ASA,  MRI of brain small acute /subacute infract, embolic, chronic microhemorrhage, ECHO unrekarkable, per cardiology no more cardiac work up, continue ASA/statin, D/C planning,  follow up for D/C planning.

## 2020-01-07 NOTE — DIETITIAN INITIAL EVALUATION ADULT. - REASON INDICATOR FOR ASSESSMENT
Nutrition consult requested for assessment and education, and verbal consult requested for new onset DM Nutrition consult requested for assessment and education, and verbal consult requested for new onset DM.

## 2020-01-07 NOTE — DISCHARGE NOTE PROVIDER - PROVIDER TOKENS
PROVIDER:[TOKEN:[[id number]],FOLLOWUP:[2 weeks]],PROVIDER:[TOKEN:[[id number]],FOLLOWUP:[2 weeks]],PROVIDER:[TOKEN:[[id number]],FOLLOWUP:[2 weeks]]

## 2020-01-07 NOTE — DIETITIAN INITIAL EVALUATION ADULT. - OTHER INFO
Pt alert, oriented, slurred speech at times, but able to communicate well, lives alone at home PTA; never on a modified diet, usually eating at the [employer] where he worked PTA; appetite good, unsure recent changes, denied GI distress, chewing or swallowing problem at present, no specific food choices reported; pt worries about not able to walk/speak normally as before, unsure if able to be back to work,  consult noted for insurance issue. Pt willing to have basic nutrition information on Healthy Plate, Heart Healthy DM meal planning, very receptive; Discussed with MD/RN

## 2020-01-07 NOTE — DIETITIAN INITIAL EVALUATION ADULT. - PERTINENT MEDS FT
MEDICATIONS  (STANDING):  amLODIPine   Tablet 5 milliGRAM(s) Oral daily  aspirin enteric coated 81 milliGRAM(s) Oral daily  atorvastatin 80 milliGRAM(s) Oral at bedtime  clopidogrel Tablet 75 milliGRAM(s) Oral daily  heparin  Injectable 5000 Unit(s) SubCutaneous every 8 hours  insulin lispro (HumaLOG) corrective regimen sliding scale   SubCutaneous Before meals and at bedtime  labetalol 100 milliGRAM(s) Oral every 8 hours

## 2020-01-07 NOTE — DISCHARGE NOTE PROVIDER - HOSPITAL COURSE
Patient is 49 year old male hx of diet controlled DM came to ED after pt ED concern for stroke.     Per pt he was completely fine last night when he went to sleep, but today morning when he woke up he noticed slurred speech, right sided facial droop, right side upper and lower extremity weakness associated with dizziness, numbness on all over his face and double vision on turning left. Pt states his symptoms are currently improving his strength is improving but he still has slurred speech and his dizziness is gone. Pt denies fever, fall, head trauma, loc, fever, difficulty hearing, chills, cough, sob, palpitation, n/v/d/c, urinary problem except slow urinary stream 2/2 bph.         pt was code stroke in the ED, had ekg showed sinus tachycardia,     CT Brain Stroke Protocol:        Low-attenuation the left corpus callosum extending into left frontal white matter suggestive of chronic infarct or gliosis.    Punctate high attenuation focus in the left corpus callosum may representing calcification or small acute hemorrhage.        Stroke         Patient was found to have right sided facial droop and right upper/lower extremity weakness. CT head was performed and showed a chronic stroke in the left corpus callosum with a concern for an acute small hemorrhage or calcification. Patient was not a candidate for tPA. Follow-up CT Angiogram of the head and neck was found to be negative and the patient's EKG was notable for sinus tachycardia. Patient passed his dysphagia screen and was permitted to eat. He was placed on Aspiring 81 mg, Plavix 75 mg, and Atorvastatin 80 mg in line with the stroke protocol. ECHO was performed and showed an EF of 55% with grade 1 diastolic dysfunction. Final imaging including an MRI confirmed a small acute to subacute infarct in the left anterior cerebral artery along with chronic microhemorrhages consistent with hypertensive encephalopathy. Patient was encouraged to continue taking his Aspirin, Plavix, and Atorvastatin and follow up with Neurology outpatient in 1-2 for additional health care guidance.         Hypertensive Emergency        Patient presented with a BP of 198/118 with elevated troponins that trended down suggesting myocardial demand ischemia. Patient received a stat dose of IV Labetalol with good effect and was made to have permissive hypertension for 24 hours. Patient was then started on Amlodipine 10 mg and Labetalol 100 mg three times a day with good improvement in blood pressure. Secondary causes of hypertension including aldosterone, cortisol, and renin levels were found to be negative and the patient was discharged under good BP control.         Diabetes        On routine labs the patient was found to have an elevated HgbA1c notable for a new diagnosis of Diabetes. While the patient was here he was placed on an insulin sliding scale under good glucose control. Patient was discharged with information on diabetic teaching and instructed to take Metformin 500 mg daily and follow-up with Dr. Rodríguez outpatient for additional healthcare maintenance.         Patient was discharged hemodynamically stable and afebrile.

## 2020-01-07 NOTE — PROGRESS NOTE ADULT - SUBJECTIVE AND OBJECTIVE BOX
Neurology Follow up note    Name  PAM REINA    HPI:  Patient is 49 year old male hx of diet controlled DM came to ED after pt ED concern for stroke.   Per pt he was completely fine last night when he went to sleep, but today morning when he woke up he noticed slurred speech, right sided facial droop, right side upper and lower extremity weakness associated with dizziness, numbness on all over his face and double vision on turning left. Pt states his symptoms are currently improving his strength is improving but he still has slurred speech and his dizziness is gone. Pt denies fever, fall, head trauma, loc, fever, difficulty hearing, chills, cough, sob, palpitation, n/v/d/c, urinary problem except slow urinary stream 2/2 bph.     pt was code stroke in the ED, had ekg showed sinus tachycardia,   CT Brain Stroke Protocol:  Low-attenuation the left corpus callosum extending into left frontal white matter suggestive of chronic infarct or gliosis.  Punctate high attenuation focus in the left corpus callosum may representing calcification or small acute hemorrhage. (04 Jan 2020 15:34)      Interval History -        Subjective:    Review of Systems:  Constitutional: No generalized weakness. No fevers or chills.                    Eyes, Ears, Mouth, Throat: No vision loss   Respiratory: No shortness of breath or cough.                                Cardiovascular: No chest pain or palpitations  Gastrointestinal: No nausea or vomiting.                                         Genitourinary: No urinary incontinence or burning on urination.  Musculoskeletal: No joint pain.                                                           Dermatologic: No rash.  Neurological: as per HPI                                                                      Psychiatric: No behavioral problems.  Endocrine: No known hypoglycemia.               Hematologic/Lymphatic: No easy bleeding.    MEDICATIONS  (STANDING):  amLODIPine   Tablet 5 milliGRAM(s) Oral daily  aspirin enteric coated 81 milliGRAM(s) Oral daily  atorvastatin 80 milliGRAM(s) Oral at bedtime  clopidogrel Tablet 75 milliGRAM(s) Oral daily  heparin  Injectable 5000 Unit(s) SubCutaneous every 8 hours  insulin lispro (HumaLOG) corrective regimen sliding scale   SubCutaneous Before meals and at bedtime  labetalol 100 milliGRAM(s) Oral every 8 hours    MEDICATIONS  (PRN):      Allergies    No Known Allergies    Intolerances        Objective:   Vital Signs Last 24 Hrs  T(C): 37 (07 Jan 2020 07:29), Max: 37 (07 Jan 2020 07:29)  T(F): 98.6 (07 Jan 2020 07:29), Max: 98.6 (07 Jan 2020 07:29)  HR: 76 (07 Jan 2020 07:29) (72 - 81)  BP: 136/92 (07 Jan 2020 07:29) (126/78 - 159/90)  BP(mean): --  RR: 18 (07 Jan 2020 07:29) (17 - 18)  SpO2: 97% (07 Jan 2020 07:29) (95% - 100%)    General Exam:   General appearance: No acute distress                 Cardiovascular: Pedal dorsalis pulses intact bilaterally    Neurological Exam:  Mental Status: Orientated to self, date and place.  Attention intact.  No dysarthria, aphasia or neglect.  Knowledge intact.  Registration intact.  Short and long term memory grossly intact.      Cranial Nerves: CN I - not tested.  PERRL, EOMI, VFF, no nystagmus or diplopia.  No APD.  Fundi not visualized bilaterally.  CN V1-3 intact to light touch and pinprick.  No facial asymmetry.  Hearing intact to finger rub bilaterally.  Tongue, uvula and palate midline.  Sternocleidomastoid and Trapezius intact bilaterally.    Motor:   Tone: normal.                  Strength: intact throughout  Pronator drift: none                 Dysmeria: None to finger-nose-finger or heel-shin-heel  No truncal ataxia.    Tremor: No resting, postural or action tremor.  No myoclonus.    Sensation: intact to light touch, pinprick, vibration and proprioception    Deep Tendon Reflexes: 1+ bilateral biceps, triceps, brachioradialis, knee and ankle  Toes flexor bilaterally    Gait: normal and stable.      Other:    01-07    142  |  109<H>  |  13  ----------------------------<  164<H>  4.2   |  27  |  1.15    Ca    9.2      07 Jan 2020 06:47  Phos  3.5     01-07  Mg     2.6     01-07    TPro  7.1  /  Alb  3.5  /  TBili  0.9  /  DBili  x   /  AST  27  /  ALT  44  /  AlkPhos  76  01-07    LIVER FUNCTIONS - ( 07 Jan 2020 06:47 )  Alb: 3.5 g/dL / Pro: 7.1 g/dL / ALK PHOS: 76 U/L / ALT: 44 U/L DA / AST: 27 U/L / GGT: x             Radiology  < from: MR Head No Cont (01.06.20 @ 16:59) >  EXAM:  MR BRAIN                            PROCEDURE DATE:  01/06/2020          INTERPRETATION:  MRI brain without contrast    History CVA    Comparison CT performed 2 days prior    There is mild chronic microvascular ischemic change in the periventricular and deep white matter without mass effect, cortical edema or hydrocephalus. There is mild diffusion restriction involving the medial left frontal lobe without mass effect or hemorrhage. There are moderately numerous scattered foci of hemosiderin in the deep white matter. The orbital and sellar contents and cerebellar tonsils are within normal limits.    IMPRESSION:    Small acute to subacute infarct in the left anterior cerebral artery distribution. Scattered chronic microhemorrhages in apattern most typical of hypertensive encephalopathy.                CINDY CARDONA M.D., ATTENDING RADIOLOGIST  This document has been electronically signed. Jan 6 2020  5:01PM              < end of copied text > Neurology Follow up note    Name  PAM REINA    Interval History -No neurological events overnight.        Subjective:  States that he still has slurred speech and double vision.    Review of Systems:  Constitutional: No generalized weakness. No fevers or chills                 Eyes, Ears, Mouth, Throat: No vision loss   Respiratory: No shortness of breath or cough                             Cardiovascular: No chest pain or palpitations  Gastrointestinal: No nausea or vomiting                                 Genitourinary: No urinary incontinence or burning on urination  Musculoskeletal: No joint pain                                                        Dermatologic: No rash  Neurological: as per HPI                                                                      Psychiatric: No behavioral problems  Endocrine: No known hypoglycemia            Hematologic/Lymphatic: No easy bleeding    MEDICATIONS  (STANDING):  amLODIPine   Tablet 5 milliGRAM(s) Oral daily  aspirin enteric coated 81 milliGRAM(s) Oral daily  atorvastatin 80 milliGRAM(s) Oral at bedtime  clopidogrel Tablet 75 milliGRAM(s) Oral daily  heparin  Injectable 5000 Unit(s) SubCutaneous every 8 hours  insulin lispro (HumaLOG) corrective regimen sliding scale   SubCutaneous Before meals and at bedtime  labetalol 100 milliGRAM(s) Oral every 8 hours    MEDICATIONS  (PRN):      Allergies    No Known Allergies    Intolerances        Objective:   Vital Signs Last 24 Hrs  T(C): 37 (07 Jan 2020 07:29), Max: 37 (07 Jan 2020 07:29)  T(F): 98.6 (07 Jan 2020 07:29), Max: 98.6 (07 Jan 2020 07:29)  HR: 76 (07 Jan 2020 07:29) (72 - 81)  BP: 136/92 (07 Jan 2020 07:29) (126/78 - 159/90)  RR: 18 (07 Jan 2020 07:29) (17 - 18)  SpO2: 97% (07 Jan 2020 07:29) (95% - 100%)    General Exam:   General appearance: No acute distress                 Cardiovascular: Pedal dorsalis pulses intact bilaterally    Neurological Exam:  Mental Status: Orientated to self, date and place.  Attention intact.  (+) Dysarthria.  No aphasia or neglect.  Knowledge intact.  Registration intact.  Short and long term memory grossly intact.      Cranial Nerves: CN I - not tested.  PERRL, EOMI, VF not full-Double vision on far left gaze c/ w left CN 6 palsy, no nystagmus.  No APD.  Fundi not visualized bilaterally.  CN V1-3 intact to light touch.  No facial asymmetry.  Hearing intact to finger rub bilaterally.  Tongue, uvula and palate midline.  Sternocleidomastoid and Trapezius intact bilaterally.    Motor:   Tone: Normal                  Strength: 5/5 in b/l upper extremities, 5/5 in LUE, 4/5 in LLE   Pronator drift: None                 (+) Dysmeria to heel-shin-heel  No truncal ataxia.    Tremor: No resting, postural or action tremor.  No myoclonus.    Sensation: intact to light touch, pinprick, vibration and proprioception    Deep Tendon Reflexes: 1+ bilateral biceps, triceps, brachioradialis, knee and ankle  Toes flexor bilaterally    Gait: normal and stable.      Other:    01-07    142  |  109<H>  |  13  ----------------------------<  164<H>  4.2   |  27  |  1.15    Ca    9.2      07 Jan 2020 06:47  Phos  3.5     01-07  Mg     2.6     01-07    TPro  7.1  /  Alb  3.5  /  TBili  0.9  /  DBili  x   /  AST  27  /  ALT  44  /  AlkPhos  76  01-07    LIVER FUNCTIONS - ( 07 Jan 2020 06:47 )  Alb: 3.5 g/dL / Pro: 7.1 g/dL / ALK PHOS: 76 U/L / ALT: 44 U/L DA / AST: 27 U/L / GGT: x             Radiology  < from: MR Head No Cont (01.06.20 @ 16:59) >  EXAM:  MR BRAIN                            PROCEDURE DATE:  01/06/2020          INTERPRETATION:  MRI brain without contrast    History CVA    Comparison CT performed 2 days prior    There is mild chronic microvascular ischemic change in the periventricular and deep white matter without mass effect, cortical edema or hydrocephalus. There is mild diffusion restriction involving the medial left frontal lobe without mass effect or hemorrhage. There are moderately numerous scattered foci of hemosiderin in the deep white matter. The orbital and sellar contents and cerebellar tonsils are within normal limits.    IMPRESSION:    Small acute to subacute infarct in the left anterior cerebral artery distribution. Scattered chronic microhemorrhages in apattern most typical of hypertensive encephalopathy.                CINDY CARDONA M.D., ATTENDING RADIOLOGIST  This document has been electronically signed. Jan 6 2020  5:01PM              < end of copied text > Neurology Follow up note    Name  PAM REINA    Interval History -No neurological events overnight.        Subjective:  States that he still has slurred speech and double vision.    Review of Systems:  Constitutional: No generalized weakness. No fevers or chills                 Eyes, Ears, Mouth, Throat: No vision loss   Respiratory: No shortness of breath or cough                             Cardiovascular: No chest pain or palpitations  Gastrointestinal: No nausea or vomiting                                 Genitourinary: No urinary incontinence or burning on urination  Musculoskeletal: No joint pain                                                        Dermatologic: No rash  Neurological: as per HPI                                                                      Psychiatric: No behavioral problems  Endocrine: No known hypoglycemia            Hematologic/Lymphatic: No easy bleeding    MEDICATIONS  (STANDING):  amLODIPine   Tablet 5 milliGRAM(s) Oral daily  aspirin enteric coated 81 milliGRAM(s) Oral daily  atorvastatin 80 milliGRAM(s) Oral at bedtime  clopidogrel Tablet 75 milliGRAM(s) Oral daily  heparin  Injectable 5000 Unit(s) SubCutaneous every 8 hours  insulin lispro (HumaLOG) corrective regimen sliding scale   SubCutaneous Before meals and at bedtime  labetalol 100 milliGRAM(s) Oral every 8 hours    MEDICATIONS  (PRN):      Allergies    No Known Allergies    Intolerances        Objective:   Vital Signs Last 24 Hrs  T(C): 37 (07 Jan 2020 07:29), Max: 37 (07 Jan 2020 07:29)  T(F): 98.6 (07 Jan 2020 07:29), Max: 98.6 (07 Jan 2020 07:29)  HR: 76 (07 Jan 2020 07:29) (72 - 81)  BP: 136/92 (07 Jan 2020 07:29) (126/78 - 159/90)  RR: 18 (07 Jan 2020 07:29) (17 - 18)  SpO2: 97% (07 Jan 2020 07:29) (95% - 100%)    General Exam:   General appearance: No acute distress                 Cardiovascular: Pedal dorsalis pulses intact bilaterally    Neurological Exam:  Mental Status: Orientated to self, date and place.  Attention intact.  (+) Dysarthria.  No aphasia or neglect.  Knowledge intact.  Registration intact.  Short and long term memory grossly intact.      Cranial Nerves: CN I - not tested.  PERRL, EOMI, VF not full-Double vision on far left gaze c/ w left CN 6 palsy.  (+) Right CN 3 palsy.   no nystagmus.  No APD.  Fundi not visualized bilaterally.  CN V1-3 intact to light touch.  No facial asymmetry.  Hearing intact to finger rub bilaterally.  Tongue, uvula and palate midline.  Sternocleidomastoid and Trapezius intact bilaterally.    Motor:   Tone: Normal                  Strength: 5/5 in b/l upper extremities, 5/5 in LUE, 4/5 in LLE   Pronator drift: None                 No dysmetria   No truncal ataxia.    Tremor: No resting, postural or action tremor.  No myoclonus.    Sensation: intact to light touch    Deep Tendon Reflexes: 2+ bilateral biceps, triceps, brachioradialis, knee and ankle  Toes flexor bilaterally    Gait: normal.  Unsteady on tandem walk    Other:  NIHSS=4  MRS=3      01-07    142  |  109<H>  |  13  ----------------------------<  164<H>  4.2   |  27  |  1.15    Ca    9.2      07 Jan 2020 06:47  Phos  3.5     01-07  Mg     2.6     01-07    TPro  7.1  /  Alb  3.5  /  TBili  0.9  /  DBili  x   /  AST  27  /  ALT  44  /  AlkPhos  76  01-07    LIVER FUNCTIONS - ( 07 Jan 2020 06:47 )  Alb: 3.5 g/dL / Pro: 7.1 g/dL / ALK PHOS: 76 U/L / ALT: 44 U/L DA / AST: 27 U/L / GGT: x             Radiology  < from: MR Head No Cont (01.06.20 @ 16:59) >  EXAM:  MR BRAIN                            PROCEDURE DATE:  01/06/2020          INTERPRETATION:  MRI brain without contrast    History CVA    Comparison CT performed 2 days prior    There is mild chronic microvascular ischemic change in the periventricular and deep white matter without mass effect, cortical edema or hydrocephalus. There is mild diffusion restriction involving the medial left frontal lobe without mass effect or hemorrhage. There are moderately numerous scattered foci of hemosiderin in the deep white matter. The orbital and sellar contents and cerebellar tonsils are within normal limits.    IMPRESSION:    Small acute to subacute infarct in the left anterior cerebral artery distribution. Scattered chronic microhemorrhages in apattern most typical of hypertensive encephalopathy.                CINDY CARDONA M.D., ATTENDING RADIOLOGIST  This document has been electronically signed. Jan 6 2020  5:01PM              < end of copied text > Neurology Follow up note    Name  PAM REINA    Interval History -No neurological events overnight.        Subjective:  States, "I'm not bad and not good."   States he still has slurred speech and double vision.    Review of Systems:  Constitutional: No generalized weakness. No fevers or chills                 Eyes, Ears, Mouth, Throat: No vision loss   Respiratory: No shortness of breath or cough                             Cardiovascular: No chest pain or palpitations  Gastrointestinal: No nausea or vomiting                                 Genitourinary: No urinary incontinence or burning on urination  Musculoskeletal: No joint pain                                                        Dermatologic: No rash  Neurological: as per HPI                                                                      Psychiatric: No behavioral problems  Endocrine: No known hypoglycemia            Hematologic/Lymphatic: No easy bleeding    MEDICATIONS  (STANDING):  amLODIPine   Tablet 5 milliGRAM(s) Oral daily  aspirin enteric coated 81 milliGRAM(s) Oral daily  atorvastatin 80 milliGRAM(s) Oral at bedtime  clopidogrel Tablet 75 milliGRAM(s) Oral daily  heparin  Injectable 5000 Unit(s) SubCutaneous every 8 hours  insulin lispro (HumaLOG) corrective regimen sliding scale   SubCutaneous Before meals and at bedtime  labetalol 100 milliGRAM(s) Oral every 8 hours    MEDICATIONS  (PRN):      Allergies    No Known Allergies    Intolerances        Objective:   Vital Signs Last 24 Hrs  T(C): 37 (07 Jan 2020 07:29), Max: 37 (07 Jan 2020 07:29)  T(F): 98.6 (07 Jan 2020 07:29), Max: 98.6 (07 Jan 2020 07:29)  HR: 76 (07 Jan 2020 07:29) (72 - 81)  BP: 136/92 (07 Jan 2020 07:29) (126/78 - 159/90)  RR: 18 (07 Jan 2020 07:29) (17 - 18)  SpO2: 97% (07 Jan 2020 07:29) (95% - 100%)    General Exam:   General appearance: No acute distress                 Cardiovascular: Pedal dorsalis pulses intact bilaterally    Neurological Exam:  Mental Status: Orientated to self, date and place.  Attention intact.  (+) Dysarthria.  No aphasia or neglect.  Knowledge intact.  Registration intact.  Short and long term memory grossly intact.      Cranial Nerves: CN I - not tested.  PERRL, EOMI, VF not full-Double vision on far left gaze c/ w left CN 6 palsy.  (+) Right CN 3 palsy.   no nystagmus.  No APD.  Fundi not visualized bilaterally.  CN V1-3 intact to light touch.  No facial asymmetry.  Hearing intact to finger rub bilaterally.  Tongue, uvula and palate midline.  Sternocleidomastoid and Trapezius intact bilaterally.    Motor:   Tone: Normal                  Strength: 5/5 in b/l upper extremities, 5/5 in LUE, 4/5 in LLE   Pronator drift: None                 No dysmetria   No truncal ataxia.    Tremor: No resting, postural or action tremor.  No myoclonus.    Sensation: intact to light touch    Deep Tendon Reflexes: 2+ bilateral biceps, triceps, brachioradialis, knee and ankle  Toes flexor bilaterally    Gait: normal.  Unsteady on tandem walk    Other:  NIHSS=4  MRS=3      01-07    142  |  109<H>  |  13  ----------------------------<  164<H>  4.2   |  27  |  1.15    Ca    9.2      07 Jan 2020 06:47  Phos  3.5     01-07  Mg     2.6     01-07    TPro  7.1  /  Alb  3.5  /  TBili  0.9  /  DBili  x   /  AST  27  /  ALT  44  /  AlkPhos  76  01-07    LIVER FUNCTIONS - ( 07 Jan 2020 06:47 )  Alb: 3.5 g/dL / Pro: 7.1 g/dL / ALK PHOS: 76 U/L / ALT: 44 U/L DA / AST: 27 U/L / GGT: x             Radiology  < from: MR Head No Cont (01.06.20 @ 16:59) >  EXAM:  MR BRAIN                            PROCEDURE DATE:  01/06/2020          INTERPRETATION:  MRI brain without contrast    History CVA    Comparison CT performed 2 days prior    There is mild chronic microvascular ischemic change in the periventricular and deep white matter without mass effect, cortical edema or hydrocephalus. There is mild diffusion restriction involving the medial left frontal lobe without mass effect or hemorrhage. There are moderately numerous scattered foci of hemosiderin in the deep white matter. The orbital and sellar contents and cerebellar tonsils are within normal limits.    IMPRESSION:    Small acute to subacute infarct in the left anterior cerebral artery distribution. Scattered chronic microhemorrhages in apattern most typical of hypertensive encephalopathy.                CINDY CARDONA M.D., ATTENDING RADIOLOGIST  This document has been electronically signed. Jan 6 2020  5:01PM              < end of copied text > Neurology Follow up note    Name  PAM REINA    Interval History -No neurological events overnight.        Subjective:  States, "I'm not bad and not good."   States he still has slurred speech and double vision.    Review of Systems:  Constitutional: No generalized weakness. No fevers or chills                 Eyes, Ears, Mouth, Throat: No vision loss   Respiratory: No shortness of breath or cough                             Cardiovascular: No chest pain or palpitations  Gastrointestinal: No nausea or vomiting                                 Genitourinary: No urinary incontinence or burning on urination  Musculoskeletal: No joint pain                                                        Dermatologic: No rash  Neurological: as per HPI                                                                      Psychiatric: No behavioral problems  Endocrine: No known hypoglycemia            Hematologic/Lymphatic: No easy bleeding    MEDICATIONS  (STANDING):  amLODIPine   Tablet 5 milliGRAM(s) Oral daily  aspirin enteric coated 81 milliGRAM(s) Oral daily  atorvastatin 80 milliGRAM(s) Oral at bedtime  clopidogrel Tablet 75 milliGRAM(s) Oral daily  heparin  Injectable 5000 Unit(s) SubCutaneous every 8 hours  insulin lispro (HumaLOG) corrective regimen sliding scale   SubCutaneous Before meals and at bedtime  labetalol 100 milliGRAM(s) Oral every 8 hours    MEDICATIONS  (PRN):      Allergies    No Known Allergies    Intolerances        Objective:   Vital Signs Last 24 Hrs  T(C): 37 (07 Jan 2020 07:29), Max: 37 (07 Jan 2020 07:29)  T(F): 98.6 (07 Jan 2020 07:29), Max: 98.6 (07 Jan 2020 07:29)  HR: 76 (07 Jan 2020 07:29) (72 - 81)  BP: 136/92 (07 Jan 2020 07:29) (126/78 - 159/90)  RR: 18 (07 Jan 2020 07:29) (17 - 18)  SpO2: 97% (07 Jan 2020 07:29) (95% - 100%)    General Exam:   General appearance: No acute distress                 Cardiovascular: Pedal dorsalis pulses intact bilaterally    Neurological Exam:  Mental Status: Orientated to self, date and place.  Attention intact.  (+) Dysarthria.  No aphasia or neglect.  Knowledge intact.  Registration intact.  Short and long term memory grossly intact.      Cranial Nerves: CN I - not tested.  PERRL, EOMI, VF not full-Double vision on far left gaze c/ w left CN 6 palsy.  (+) Right CN 3 palsy.   no nystagmus.  No APD.  Fundi not visualized bilaterally.  CN V1-3 intact to light touch.  (+) facial asymmetry.  Hearing intact to finger rub bilaterally.  Tongue, uvula and palate midline.  Sternocleidomastoid and Trapezius intact bilaterally.    Motor:   Tone: Normal                  Strength: 5/5 in b/l upper extremities, 5/5 in LUE, 4/5 in LLE   Pronator drift: None                 No dysmetria   No truncal ataxia.    Tremor: No resting, postural or action tremor.  No myoclonus.    Sensation: intact to light touch    Deep Tendon Reflexes: 2+ bilateral biceps, triceps, brachioradialis, knee and ankle  Toes flexor bilaterally    Gait: normal.  Unsteady on tandem walk    Other:  NIHSS=4  MRS=3      01-07    142  |  109<H>  |  13  ----------------------------<  164<H>  4.2   |  27  |  1.15    Ca    9.2      07 Jan 2020 06:47  Phos  3.5     01-07  Mg     2.6     01-07    TPro  7.1  /  Alb  3.5  /  TBili  0.9  /  DBili  x   /  AST  27  /  ALT  44  /  AlkPhos  76  01-07    LIVER FUNCTIONS - ( 07 Jan 2020 06:47 )  Alb: 3.5 g/dL / Pro: 7.1 g/dL / ALK PHOS: 76 U/L / ALT: 44 U/L DA / AST: 27 U/L / GGT: x             Radiology  < from: MR Head No Cont (01.06.20 @ 16:59) >  EXAM:  MR BRAIN                            PROCEDURE DATE:  01/06/2020          INTERPRETATION:  MRI brain without contrast    History CVA    Comparison CT performed 2 days prior    There is mild chronic microvascular ischemic change in the periventricular and deep white matter without mass effect, cortical edema or hydrocephalus. There is mild diffusion restriction involving the medial left frontal lobe without mass effect or hemorrhage. There are moderately numerous scattered foci of hemosiderin in the deep white matter. The orbital and sellar contents and cerebellar tonsils are within normal limits.    IMPRESSION:    Small acute to subacute infarct in the left anterior cerebral artery distribution. Scattered chronic microhemorrhages in apattern most typical of hypertensive encephalopathy.                CINDY CARDONA M.D., ATTENDING RADIOLOGIST  This document has been electronically signed. Jan 6 2020  5:01PM              < end of copied text > Neurology Follow up note    Name  PAM REINA    Subjective:  States that he still has slurred speech and double vision.    Review of Systems:  Constitutional: No fevers                            Cardiovascular: No chest pain or palpitations    MEDICATIONS  (STANDING):  amLODIPine   Tablet 5 milliGRAM(s) Oral daily  aspirin enteric coated 81 milliGRAM(s) Oral daily  atorvastatin 80 milliGRAM(s) Oral at bedtime  clopidogrel Tablet 75 milliGRAM(s) Oral daily  heparin  Injectable 5000 Unit(s) SubCutaneous every 8 hours  insulin lispro (HumaLOG) corrective regimen sliding scale   SubCutaneous Before meals and at bedtime  labetalol 100 milliGRAM(s) Oral every 8 hours    MEDICATIONS  (PRN):      Allergies    No Known Allergies    Intolerances        Objective:   Vital Signs Last 24 Hrs  T(C): 37 (07 Jan 2020 07:29), Max: 37 (07 Jan 2020 07:29)  T(F): 98.6 (07 Jan 2020 07:29), Max: 98.6 (07 Jan 2020 07:29)  HR: 76 (07 Jan 2020 07:29) (72 - 81)  BP: 136/92 (07 Jan 2020 07:29) (126/78 - 159/90)  RR: 18 (07 Jan 2020 07:29) (17 - 18)  SpO2: 97% (07 Jan 2020 07:29) (95% - 100%)    General Exam:   General appearance: No acute distress                 Cardiovascular: Pedal dorsalis pulses intact bilaterally    Neurological Exam:  Mental Status: Orientated to self, date and place.  Attention intact.  (+) Dysarthria.  No aphasia or neglect.  Knowledge intact.  Registration intact.  Short and long term memory grossly intact.      Cranial Nerves: CN I - not tested.  PERRL, EOMI, VF not full-Double vision on far left gaze c/ w left CN 6 palsy.  (+) Right CN 3 palsy.   no nystagmus.  No APD.  Fundi not visualized bilaterally.  CN V1-3 intact to light touch.  +right NLF..  Hearing intact to finger rub bilaterally.  Tongue, uvula and palate midline.  Sternocleidomastoid and Trapezius intact bilaterally.    Motor:   Tone: Normal                  Strength: 5/5 in b/l upper extremities, 5/5 in LUE, 4/5 in LLE   Pronator drift: None                 No dysmetria   No truncal ataxia.    Tremor: No resting, postural or action tremor.  No myoclonus.    Sensation: intact to light touch    Gait: normal.  Unsteady on tandem walk    Other:  NIHSS=4  MRS=3      01-07    142  |  109<H>  |  13  ----------------------------<  164<H>  4.2   |  27  |  1.15    Ca    9.2      07 Jan 2020 06:47  Phos  3.5     01-07  Mg     2.6     01-07    TPro  7.1  /  Alb  3.5  /  TBili  0.9  /  DBili  x   /  AST  27  /  ALT  44  /  AlkPhos  76  01-07    LIVER FUNCTIONS - ( 07 Jan 2020 06:47 )  Alb: 3.5 g/dL / Pro: 7.1 g/dL / ALK PHOS: 76 U/L / ALT: 44 U/L DA / AST: 27 U/L / GGT: x             Radiology  < from: MR Head No Cont (01.06.20 @ 16:59) >  EXAM:  MR BRAIN                            PROCEDURE DATE:  01/06/2020          INTERPRETATION:  MRI brain without contrast    History CVA    Comparison CT performed 2 days prior    There is mild chronic microvascular ischemic change in the periventricular and deep white matter without mass effect, cortical edema or hydrocephalus. There is mild diffusion restriction involving the medial left frontal lobe without mass effect or hemorrhage. There are moderately numerous scattered foci of hemosiderin in the deep white matter. The orbital and sellar contents and cerebellar tonsils are within normal limits.    IMPRESSION:    Small acute to subacute infarct in the left anterior cerebral artery distribution. Scattered chronic microhemorrhages in apattern most typical of hypertensive encephalopathy.                CINDY CARDONA M.D., ATTENDING RADIOLOGIST  This document has been electronically signed. Jan 6 2020  5:01PM              < end of copied text >

## 2020-01-07 NOTE — DISCHARGE NOTE NURSING/CASE MANAGEMENT/SOCIAL WORK - NSDCPEPTSTRK_GEN_ALL_CORE
Stroke education booklet/Call 911 for stroke/Prescribed medications/Risk factors for stroke/Stroke support groups for patients, families, and friends/Need for follow up after discharge/Stroke warning signs and symptoms/Signs and symptoms of stroke

## 2020-01-07 NOTE — PROGRESS NOTE ADULT - ASSESSMENT
48yo male w/ Acute Stroke     Recommendations:    -BP control and mgmt per Primary Team    -Continue ASA, Atorvastatin, & Plavix.  DVT ppx while inpt.    -Continue PT    -Outpt PCP & Neuro f/u Small acute to subacute infarct in the left anterior cerebral artery distribution.  chronic microhemorrhages due to hypertensive encephalopathy.    Recommendations:  asa 81, lipitor 40.  plavix x 3 weeks  BP goal of normal  PT, speech therapy  outpatient neuro fu in 2 weeks    dw resident and Dr. Walker

## 2020-01-09 LAB
METANEPHRINE, PL: 21 PG/ML — SIGNIFICANT CHANGE UP (ref 0–62)
NORMETANEPHRINE, PL: 130 PG/ML — SIGNIFICANT CHANGE UP (ref 0–145)

## 2020-01-10 LAB — RENIN PLAS-CCNC: 0.86 NG/ML/HR — SIGNIFICANT CHANGE UP (ref 0.17–5.38)

## 2020-06-30 PROBLEM — R73.03 PREDIABETES: Chronic | Status: ACTIVE | Noted: 2020-01-04

## 2020-07-02 PROBLEM — Z00.00 ENCOUNTER FOR PREVENTIVE HEALTH EXAMINATION: Status: ACTIVE | Noted: 2020-07-02

## 2020-07-08 ENCOUNTER — APPOINTMENT (OUTPATIENT)
Dept: NEUROLOGY | Facility: CLINIC | Age: 50
End: 2020-07-08

## 2020-12-02 ENCOUNTER — OUTPATIENT (OUTPATIENT)
Dept: OUTPATIENT SERVICES | Facility: HOSPITAL | Age: 50
LOS: 1 days | End: 2020-12-02
Payer: MEDICAID

## 2020-12-02 ENCOUNTER — RESULT REVIEW (OUTPATIENT)
Age: 50
End: 2020-12-02

## 2020-12-02 DIAGNOSIS — I21.4 NON-ST ELEVATION (NSTEMI) MYOCARDIAL INFARCTION: ICD-10-CM

## 2020-12-02 PROCEDURE — A9502: CPT

## 2020-12-02 PROCEDURE — 78452 HT MUSCLE IMAGE SPECT MULT: CPT

## 2020-12-02 PROCEDURE — 93017 CV STRESS TEST TRACING ONLY: CPT

## 2020-12-03 ENCOUNTER — TRANSCRIPTION ENCOUNTER (OUTPATIENT)
Age: 50
End: 2020-12-03

## 2021-01-06 ENCOUNTER — APPOINTMENT (OUTPATIENT)
Dept: NEUROLOGY | Facility: CLINIC | Age: 51
End: 2021-01-06

## 2023-03-22 NOTE — ED PROVIDER NOTE - NIH STROKE SCALE: 9. BEST LANGUAGE
oriented to person, place and time, normal sensation, short and long term memory intact, sensory exam intact (1) Mild-to-moderate aphasia; some obvious loss of fluency or facility of comprehension, w/o significant limitation on ideas expressed or form of expression. Reduction of speech and/or comprehension, however, makes conversation about provided material difficult or impossible.  For example, in conversation about provided materials, examiner can identify picture or naming card content from patient's response.

## 2024-12-19 NOTE — PROGRESS NOTE ADULT - SUBJECTIVE AND OBJECTIVE BOX
Conjuntivae and eyelids appear normal, Sclerae : White without injection Patient is a 49y old  Male who presents with a chief complaint of CVA (04 Jan 2020 15:34)/right debbie      INTERVAL HPI/OVERNIGHT EVENTS:  T(C): 36.8 (01-05-20 @ 08:27), Max: 37.9 (01-04-20 @ 12:53)  HR: 87 (01-05-20 @ 08:27) (78 - 119)  BP: 138/84 (01-05-20 @ 08:27) (138/84 - 206/118)  RR: 18 (01-05-20 @ 08:27) (18 - 23)  SpO2: 97% (01-05-20 @ 08:27) (96% - 99%)  Wt(kg): --    LABS:                        16.7   8.33  )-----------( 220      ( 05 Jan 2020 07:10 )             49.5     01-05    140  |  106  |  8   ----------------------------<  162<H>  4.0   |  25  |  1.01    Ca    8.9      05 Jan 2020 07:10  Phos  3.0     01-05  Mg     2.4     01-05    TPro  7.7  /  Alb  3.7  /  TBili  1.0  /  DBili  x   /  AST  20  /  ALT  37  /  AlkPhos  79  01-05    PT/INR - ( 04 Jan 2020 12:59 )   PT: 12.2 sec;   INR: 1.10 ratio         PTT - ( 04 Jan 2020 12:59 )  PTT:31.0 sec    CAPILLARY BLOOD GLUCOSE      POCT Blood Glucose.: 134 mg/dL (05 Jan 2020 07:53)  POCT Blood Glucose.: 112 mg/dL (04 Jan 2020 12:43)        RADIOLOGY & ADDITIONAL TESTS:  < from: CT Angio Neck w/ IV Cont (01.04.20 @ 13:17) >    IMPRESSION:  Findings within normal limits      < end of copied text >  < from: CT Angio Head w/ IV Cont (01.04.20 @ 13:17) >  IMPRESSION:  Findings within normal limits      < end of copied text >  < from: Xray Chest 1 View AP/PA (01.04.20 @ 13:14) >    IMPRESSION:    Mild cardiomegaly. No acute infiltrate.      < end of copied text >  < from: CT Brain Stroke Protocol (01.04.20 @ 13:12) >    IMPRESSION:    Low-attenuation the left corpus callosum extending into left frontal white matter suggestive of chronic infarct or gliosis.    Punctate high attenuation focus in the left corpus callosum may representing calcification or small acute hemorrhage.    Recommend MRI to evaluate for acute ischemia.    CRITICAL VALUE: I discussed the major findings in this report with Dr. Valarie Lyle at 1/4/2020 1:09 PM with readback. Critical value policy of the hospital was followed. Read back and confirmation of receipt of this communication was  performed. This verbal communication supplements the text report of this document.      < end of copied text >    Consultant(s) Notes Reviewed:  [x ] YES  [ ] NO    PHYSICAL EXAM:  GENERAL: well built, well nourished  HEAD:  Atraumatic, Normocephalic  EYES: EOMI, PERRLA, conjunctiva and sclera clear  ENT: No tonsillar erythema, exudates, or enlargement; Moist mucous membranes, Good dentition, No lesions  NECK: Supple, No JVD, Normal thyroid, no enlarged nodes  NERVOUS SYSTEM:  Alert & mild facial drop and right debbie  CHEST/LUNG: B/L good air entry; No rales, rhonchi, or wheezing  HEART: S1S2 normal, no S3, Regular rate and rhythm; No murmurs, rubs, or gallops  ABDOMEN: Soft, Nontender, Nondistended; Bowel sounds present  EXTREMITIES:  2+ Peripheral Pulses, No clubbing, cyanosis, or edema  LYMPH: No lymphadenopathy noted  SKIN: No rashes or lesions    Care Discussed with Consultants/Other Providers [ x] YES  [ ] NO
